# Patient Record
Sex: FEMALE | Race: WHITE | Employment: PART TIME | URBAN - NONMETROPOLITAN AREA
[De-identification: names, ages, dates, MRNs, and addresses within clinical notes are randomized per-mention and may not be internally consistent; named-entity substitution may affect disease eponyms.]

---

## 2017-08-21 PROBLEM — M54.16 LUMBAR RADICULOPATHY: Status: ACTIVE | Noted: 2017-08-21

## 2019-05-27 ENCOUNTER — HOSPITAL ENCOUNTER (EMERGENCY)
Age: 34
Discharge: HOME | End: 2019-05-27
Attending: EMERGENCY MEDICINE

## 2019-05-27 ENCOUNTER — APPOINTMENT (OUTPATIENT)
Dept: GENERAL RADIOLOGY | Age: 34
End: 2019-05-27

## 2019-05-27 VITALS
TEMPERATURE: 98 F | SYSTOLIC BLOOD PRESSURE: 159 MMHG | WEIGHT: 210 LBS | HEART RATE: 107 BPM | RESPIRATION RATE: 20 BRPM | BODY MASS INDEX: 34.99 KG/M2 | HEIGHT: 65 IN | DIASTOLIC BLOOD PRESSURE: 85 MMHG | OXYGEN SATURATION: 100 %

## 2019-05-27 DIAGNOSIS — S42.92XA CLOSED FRACTURE DISLOCATION OF LEFT SHOULDER, INITIAL ENCOUNTER: Primary | ICD-10-CM

## 2019-05-27 PROCEDURE — 96374 THER/PROPH/DIAG INJ IV PUSH: CPT

## 2019-05-27 PROCEDURE — 73020 X-RAY EXAM OF SHOULDER: CPT

## 2019-05-27 PROCEDURE — 99152 MOD SED SAME PHYS/QHP 5/>YRS: CPT

## 2019-05-27 PROCEDURE — 4500000023 HC ED LEVEL 3 PROCEDURE

## 2019-05-27 PROCEDURE — 2500000003 HC RX 250 WO HCPCS

## 2019-05-27 PROCEDURE — 6360000002 HC RX W HCPCS: Performed by: EMERGENCY MEDICINE

## 2019-05-27 PROCEDURE — 99283 EMERGENCY DEPT VISIT LOW MDM: CPT

## 2019-05-27 PROCEDURE — 6360000002 HC RX W HCPCS

## 2019-05-27 PROCEDURE — 73030 X-RAY EXAM OF SHOULDER: CPT

## 2019-05-27 PROCEDURE — 96375 TX/PRO/DX INJ NEW DRUG ADDON: CPT

## 2019-05-27 RX ORDER — ONDANSETRON 2 MG/ML
INJECTION INTRAMUSCULAR; INTRAVENOUS
Status: COMPLETED
Start: 2019-05-27 | End: 2019-05-27

## 2019-05-27 RX ORDER — FENTANYL CITRATE 50 UG/ML
50 INJECTION, SOLUTION INTRAMUSCULAR; INTRAVENOUS ONCE
Status: COMPLETED | OUTPATIENT
Start: 2019-05-27 | End: 2019-05-27

## 2019-05-27 RX ORDER — ONDANSETRON 2 MG/ML
4 INJECTION INTRAMUSCULAR; INTRAVENOUS ONCE
Status: COMPLETED | OUTPATIENT
Start: 2019-05-27 | End: 2019-05-27

## 2019-05-27 RX ORDER — KETAMINE HYDROCHLORIDE 50 MG/ML
2 INJECTION, SOLUTION, CONCENTRATE INTRAMUSCULAR; INTRAVENOUS ONCE
Status: COMPLETED | OUTPATIENT
Start: 2019-05-27 | End: 2019-05-27

## 2019-05-27 RX ORDER — KETAMINE HYDROCHLORIDE 50 MG/ML
INJECTION, SOLUTION, CONCENTRATE INTRAMUSCULAR; INTRAVENOUS
Status: COMPLETED
Start: 2019-05-27 | End: 2019-05-27

## 2019-05-27 RX ORDER — PROMETHAZINE HYDROCHLORIDE 25 MG/ML
INJECTION, SOLUTION INTRAMUSCULAR; INTRAVENOUS
Status: COMPLETED
Start: 2019-05-27 | End: 2019-05-27

## 2019-05-27 RX ORDER — PROMETHAZINE HYDROCHLORIDE 25 MG/ML
12.5 INJECTION, SOLUTION INTRAMUSCULAR; INTRAVENOUS ONCE
Status: COMPLETED | OUTPATIENT
Start: 2019-05-27 | End: 2019-05-27

## 2019-05-27 RX ORDER — OXYCODONE HYDROCHLORIDE AND ACETAMINOPHEN 5; 325 MG/1; MG/1
1 TABLET ORAL EVERY 6 HOURS PRN
Qty: 20 TABLET | Refills: 0 | Status: SHIPPED | OUTPATIENT
Start: 2019-05-27 | End: 2019-05-30

## 2019-05-27 RX ORDER — FENTANYL CITRATE 50 UG/ML
INJECTION, SOLUTION INTRAMUSCULAR; INTRAVENOUS
Status: COMPLETED
Start: 2019-05-27 | End: 2019-05-27

## 2019-05-27 RX ADMIN — FENTANYL CITRATE 50 MCG: 50 INJECTION, SOLUTION INTRAMUSCULAR; INTRAVENOUS at 19:24

## 2019-05-27 RX ADMIN — ONDANSETRON 4 MG: 2 INJECTION INTRAMUSCULAR; INTRAVENOUS at 19:24

## 2019-05-27 RX ADMIN — PROMETHAZINE HYDROCHLORIDE 12.5 MG: 25 INJECTION, SOLUTION INTRAMUSCULAR; INTRAVENOUS at 21:35

## 2019-05-27 RX ADMIN — KETAMINE HYDROCHLORIDE 280 MG: 50 INJECTION INTRAMUSCULAR; INTRAVENOUS at 21:27

## 2019-05-27 RX ADMIN — PROMETHAZINE HYDROCHLORIDE 12.5 MG: 25 INJECTION INTRAMUSCULAR; INTRAVENOUS at 21:35

## 2019-05-27 RX ADMIN — KETAMINE HYDROCHLORIDE 280 MG: 50 INJECTION, SOLUTION, CONCENTRATE INTRAMUSCULAR; INTRAVENOUS at 21:27

## 2019-05-27 RX ADMIN — FENTANYL CITRATE 50 MCG: 50 INJECTION, SOLUTION INTRAMUSCULAR; INTRAVENOUS at 19:52

## 2019-05-27 ASSESSMENT — PAIN DESCRIPTION - PAIN TYPE: TYPE: ACUTE PAIN

## 2019-05-27 ASSESSMENT — PAIN DESCRIPTION - LOCATION: LOCATION: SHOULDER

## 2019-05-27 ASSESSMENT — PAIN SCALES - GENERAL: PAINLEVEL_OUTOF10: 10

## 2019-05-27 ASSESSMENT — PAIN DESCRIPTION - ORIENTATION: ORIENTATION: LEFT

## 2019-05-27 NOTE — ED NOTES
Ortho paged through [facility] @ [address]  05/27/19 1944

## 2019-05-27 NOTE — ED PROVIDER NOTES
Triage Chief Complaint:   Shoulder Injury (pt states she fell through a loose board in the floor injuring her L shoulder)      [place]:  Junior Tejeda is a 35 y.o. female that presents  With a left shoulder injury. Patient fell and grabbed a hand rail which resulted in pain in her left shoulder. She is concerned that her shoulder may be dislocated. She does not have a history of dislocated shoulder and is otherwise healthy. She does not believe herself to be pregnant. There was no open wound associated with this injury which occurred shortly before arrival in the emergency department    ROS:  Review of systems was reviewed for 10 systems and is otherwise negative except as in the [address]    Past Medical History:   Diagnosis Date    Polycystic ovarian syndrome      Past Surgical History:   Procedure Laterality Date    CYST REMOVAL       History reviewed. No pertinent family history.   Social History     Socioeconomic History    Marital status: Single     Spouse name: Not on file    Number of children: Not on file    Years of education: Not on file    Highest education level: Not on file   Occupational History    Not on file   Social Needs    Financial resource strain: Not on file    Food insecurity:     Worry: Not on file     Inability: Not on file    Transportation needs:     Medical: Not on file     Non-medical: Not on file   Tobacco Use    Smoking status: Never Smoker    Smokeless tobacco: Never Used   Substance and Sexual Activity    Alcohol use: Yes     Comment: weekly    Drug use: No    Sexual activity: Not on file   Lifestyle    Physical activity:     Days per week: Not on file     Minutes per session: Not on file    Stress: Not on file   Relationships    Social connections:     Talks on phone: Not on file     Gets together: Not on file     Attends Buddhist service: Not on file     Active member of club or organization: Not on file     Attends meetings of clubs or organizations: Not on file understands that she may need surgery. Pain medicine will be provided and she is to remain in the sling and use ice until seeing Dr. Devin Ayala    Final Impression:  1. Closed fracture dislocation of left shoulder, initial encounter        Critical Care:       Disposition referral (if applicable):  Luca Call MD  [id number] [facility] [phone]    Call in 1 day        Disposition medications (if applicable):  Discharge Medication List as of 5/27/2019  9:08 PM      START taking these medications    Details   oxyCODONE-acetaminophen (PERCOCET) 5-325 MG per tablet Take 1 tablet by mouth every 6 hours as needed for Pain for up to 3 days. Intended supply: 3 days. Take lowest dose possible to manage pain, Disp-20 tablet, R-0Print             Comment: Please note this report has been produced using speech recognition software and may contain errors related to that system including errors in grammar, punctuation, and spelling, as well as words and phrases that may be inappropriate. If there are any questions or concerns please feel free to contact the dictating provider for clarification.       (Please note that portions of this note may have been completed with a voice recognition program. Efforts were made to edit the dictations but occasionally words are mis-transcribed.)    MD Roni Mcintosh., MD  05/28/19 6368

## 2019-05-30 ENCOUNTER — OFFICE VISIT (OUTPATIENT)
Dept: ORTHOPEDIC SURGERY | Age: 34
End: 2019-05-30
Payer: COMMERCIAL

## 2019-05-30 VITALS — WEIGHT: 210.1 LBS | BODY MASS INDEX: 35 KG/M2 | HEIGHT: 65 IN

## 2019-05-30 DIAGNOSIS — S42.252A CLOSED DISPLACED FRACTURE OF GREATER TUBEROSITY OF LEFT HUMERUS, INITIAL ENCOUNTER: ICD-10-CM

## 2019-05-30 DIAGNOSIS — S43.005A DISLOCATION OF LEFT SHOULDER JOINT, INITIAL ENCOUNTER: ICD-10-CM

## 2019-05-30 DIAGNOSIS — M25.512 LEFT SHOULDER PAIN, UNSPECIFIED CHRONICITY: Primary | ICD-10-CM

## 2019-05-30 PROCEDURE — 99243 OFF/OP CNSLTJ NEW/EST LOW 30: CPT | Performed by: ORTHOPAEDIC SURGERY

## 2019-05-30 NOTE — PROGRESS NOTES
status: Never Smoker    Smokeless tobacco: Never Used   Substance and Sexual Activity    Alcohol use: Yes     Comment: weekly    Drug use: No    Sexual activity: Not on file   Lifestyle    Physical activity:     Days per week: Not on file     Minutes per session: Not on file    Stress: Not on file   Relationships    Social connections:     Talks on phone: Not on file     Gets together: Not on file     Attends Buddhist service: Not on file     Active member of club or organization: Not on file     Attends meetings of clubs or organizations: Not on file     Relationship status: Not on file    Intimate partner violence:     Fear of current or ex partner: Not on file     Emotionally abused: Not on file     Physically abused: Not on file     Forced sexual activity: Not on file   Other Topics Concern    Not on file   Social History Narrative    Not on file       No family history on file. Review of Systems  Pertinent items are noted in HPI  Review of systems reviewed from Patient History Form dated on 5/30/19 and available in the patient's chart under the Media tab. Vital Signs  There were no vitals filed for this visit. General Exam:   Constitutional: Patient is adequately groomed with no evidence of malnutrition  Mental Status: The patient is oriented to time, place and person. The patient's mood and affect are appropriate. Lymphatic: The lymphatic examination bilaterally reveals all areas to be without enlargement or induration. Neurological: The patient has good coordination. There is no weakness or sensory deficit. Gait: normal    Left shoulder and Elbow Examination  Inspection:  Positive bruising medial aspect of arm, no obvious deformity    Palpation:  Tenderness globally around the shoulder, no elbow tenderness    Range of Motion:  Range of motion not assessed, normal hand wrist and elbow motion    Sensation: In tact to light touch all nerve distributions     Strength:   Motor appears intact with deltoid firing, otherwise motor intact C5 to T1    Special Tests:  None    Skin: There are no additional worrisome rashes, ulcerations or lesions. Circulation normal    Additional Examinations:  Right Upper Extremity:  Examination of the right upper extremity does not show any tenderness, deformity or injury. Range of motion is unremarkable. There is no gross instability. There are no rashes, ulcerations or lesions. Strength and tone are normal.      Radiology:     X-rays obtained and reviewed in office:  AP and the plane of the joint and scapular wide 2 views of the left shoulder obtained in office today 5/30/19 demonstrate apparent reduction of the dislocation with evidence of fracture of the greater tuberosity extending distally to the proximal aspect of the shaft with good alignment. There does appear to be some comminution at the greater tuberosity with possible displacement of some of the fragments posteriorly. Unable to perform axillary view today secondary to patient unable to comply    EXAMINATION:   1 XRAY VIEWS OF THE LEFT SHOULDER       5/27/2019 5:50 pm       COMPARISON:   None.       HISTORY:   ORDERING SYSTEM PROVIDED HISTORY: post procedure   TECHNOLOGIST PROVIDED HISTORY:   Reason for exam:->post procedure   Ordering Physician Provided Reason for Exam: Shoulder Injury (pt states she   fell through a loose board in the floor injuring her L shoulder)   post   reduction   Acuity: Acute   Type of Exam: Subsequent/Follow-up       FINDINGS:   The fracture dislocation of the left shoulder were appears reduced, but only   a single projection was performed, therefore the AP location of the humeral   head cannot be ascertained with complete certainty.  Fracture of the greater   tuberosity is again noted, and the distraction of the fracture fragments is   decreased.           Impression   The fracture dislocation of the left shoulder appears to be reduced on this   single projection.  Again, AP location of the humeral head cannot be   ascertained with complete certainty as only 1 projection was performed. EXAMINATION:   2 XRAY VIEWS OF THE LEFT SHOULDER       5/27/2019 7:39 pm       COMPARISON:   None.       HISTORY:   ORDERING SYSTEM PROVIDED HISTORY: Trauma   TECHNOLOGIST PROVIDED HISTORY:   Reason for exam:->Trauma   Ordering Physician Provided Reason for Exam: Shoulder Injury (pt states she   fell through a loose board in the floor injuring her L shoulder)   Acuity: Acute   Type of Exam: Initial       FINDINGS:   Anterior inferior dislocation of the humerus.  Comminuted fracture in the   region of the greater tuberosity of the proximal humerus.  The AC joint is in   anatomic alignment.           Impression   1. Anterior inferior dislocation of the humerus   2. Comminuted fracture in the region of the greater tuberosity         Assessment:  72-year-old female with a left shoulder dislocation with a greater tuberosity fracture    Office Procedures:  Orders Placed This Encounter   Procedures    XR SHOULDER LEFT (MIN 2 VIEWS)     Standing Status:   Future     Number of Occurrences:   1     Standing Expiration Date:   5/30/2020       Plan:   -We discussed that ideally a CT scan would be performed to fully evaluate her greater tuberosity for placement as well as comminution, however she wishes to avoid this if possible secondary to financial constraints  -I do think based on the alignment of her fracture currently we can likely treat her with nonsurgical treatment, however I would recommend close clinical follow-up. We will see her back in 1 week for x-rays 4 views of her left shoulder. We discussed we will need to get an axillary view to fully evaluate her greater tuberosity placement  -We discussed there is significant displacement that could be possible malunion which could lead to functional deficits  -At this time she will remain in the sling.   She may remove it for hand wrist elbow motion only and hygiene  -She may transition over-the-counter medication  -We will provide her with documentation regarding her limitations at this time for work and school  -We will see her back in 1 week and if there are issues interim she'll contact the office    Ju Barton. MD [place]  [phone] partner of [facility] ([facility])        Voice Recognition Dictation disclaimer: Please note that portions of this chart were generated using Dragon dictation software. Although every effort was made to ensure the accuracy of this automated transcription, some errors in transcription may have occurred.

## 2019-05-30 NOTE — LETTER
[place] [address]  Phone: [phone]  Fax: [phone]    Tess Hopson MD        May 30, 2019     Patient: Shireen Marcano   YOB: 1985   Date of Visit: 5/30/2019       To Whom it May Concern:    Trinidad Lovell was seen in my clinic on 5/30/2019. Due to her left shoulder injury she in unable to use her left upper extremity at this time until further release. If you have any questions or concerns, please don't hesitate to call.     Sincerely,         Tess Hopson MD

## 2019-06-06 ENCOUNTER — OFFICE VISIT (OUTPATIENT)
Dept: ORTHOPEDIC SURGERY | Age: 34
End: 2019-06-06
Payer: COMMERCIAL

## 2019-06-06 VITALS — WEIGHT: 210.1 LBS | HEIGHT: 65 IN | BODY MASS INDEX: 35 KG/M2

## 2019-06-06 DIAGNOSIS — S42.252A CLOSED DISPLACED FRACTURE OF GREATER TUBEROSITY OF LEFT HUMERUS, INITIAL ENCOUNTER: ICD-10-CM

## 2019-06-06 DIAGNOSIS — M25.512 PAIN IN JOINT OF LEFT SHOULDER REGION: Primary | ICD-10-CM

## 2019-06-06 DIAGNOSIS — S43.005A DISLOCATION OF LEFT SHOULDER JOINT, INITIAL ENCOUNTER: ICD-10-CM

## 2019-06-06 PROCEDURE — 99213 OFFICE O/P EST LOW 20 MIN: CPT | Performed by: ORTHOPAEDIC SURGERY

## 2019-06-06 NOTE — PROGRESS NOTES
Concern    Not on file   Social History Narrative    Not on file       No family history on file. Review of Systems  Pertinent items are noted in HPI  Review of systems reviewed from Patient History Form dated on 5/30/19 and available in the patient's chart under the Media tab. Vital Signs  There were no vitals filed for this visit. General Exam:   Constitutional: Patient is adequately groomed with no evidence of malnutrition  Mental Status: The patient is oriented to time, place and person. The patient's mood and affect are appropriate. Lymphatic: The lymphatic examination bilaterally reveals all areas to be without enlargement or induration. Neurological: The patient has good coordination. There is no weakness or sensory deficit. Gait: Normal    Left Upper extremity  Examination  Inspection:  Positive bruising anterior aspect of arm, no obvious deformity    Palpation:  Tenderness globally around the shoulder    Range of Motion:  Shoulder range of motion not assessed today    Sensation: In tact to light touch all nerve distributions     Strength: Motor intact without deficit, shoulder strength not assessed    Special Tests:  None    Skin: There are no additional worrisome rashes, ulcerations or lesions. Circulation normal    Additional Examinations:  Right Upper Extremity:  Examination of the right upper extremity does not show any tenderness, deformity or injury. Range of motion is unremarkable. There is no gross instability. There are no rashes, ulcerations or lesions. Strength and tone are normal.      Radiology:     X-rays obtained and reviewed in office:  AP, AP in the plane of the joint, scapular Y, axillary 4 views left shoulder obtained 6/6/19 compared to previous x-rays demonstrate no dislocation. Evidence of greater tuberosity fracture with good alignment of the majority the fracture piece with some small evidence of comminution and displaced small fragments.   Good alignment of the fracture with no further displacement      Assessment:  44-year-old female 1.5 weeks after a left shoulder dislocation with a greater tuberosity fracture    Office Procedures:  Orders Placed This Encounter   Procedures    XR SHOULDER LEFT (MIN 2 VIEWS)     Standing Status:   Future     Number of Occurrences:   1     Standing Expiration Date:   7/6/2019       Plan:   -This time the alignment of her fracture is sufficient for continued nonoperative treatment. We discussed we will allow her greater tuberosity fracture to heal and then begin with therapy  -He is to remain nonweightbearing and in her sling for her left upper extremity at this time. She may remove it for hygiene as well as motion of her hand wrist and elbow  -She may transition to over-the-counter medication  -We will see her back in 5 weeks for repeat evaluation and x-rays of the left shoulder and if there are issues interim she'll contact the office    Rashad Hedrick MD  [address] [facility] partner of [facility]        Voice Recognition Dictation disclaimer: Please note that portions of this chart were generated using Dragon dictation software. Although every effort was made to ensure the accuracy of this automated transcription, some errors in transcription may have occurred.

## 2019-06-06 NOTE — LETTER
[facility] [place] [address]  Phone: [phone]  Fax: [phone]    Rich Sherman MD        June 6, 2019     Patient: Horace Ruiz   YOB: 1985   Date of Visit: 6/6/2019       To Whom it May Concern:    Adrian Muñoz was seen in my clinic on 6/6/2019. Due to her injury, no use of her left upper extremity until further release. If you have any questions or concerns, please don't hesitate to call.     Sincerely,         Rich Sherman MD

## 2019-07-15 ENCOUNTER — OFFICE VISIT (OUTPATIENT)
Dept: ORTHOPEDIC SURGERY | Age: 34
End: 2019-07-15
Payer: COMMERCIAL

## 2019-07-15 VITALS — HEIGHT: 65 IN | BODY MASS INDEX: 35 KG/M2 | WEIGHT: 210.1 LBS

## 2019-07-15 DIAGNOSIS — S43.005A DISLOCATION OF LEFT SHOULDER JOINT, INITIAL ENCOUNTER: Primary | ICD-10-CM

## 2019-07-15 PROCEDURE — 99213 OFFICE O/P EST LOW 20 MIN: CPT | Performed by: ORTHOPAEDIC SURGERY

## 2019-07-24 ENCOUNTER — TELEPHONE (OUTPATIENT)
Dept: ORTHOPEDIC SURGERY | Age: 34
End: 2019-07-24

## 2019-07-30 ENCOUNTER — HOSPITAL ENCOUNTER (OUTPATIENT)
Dept: MRI IMAGING | Age: 34
Discharge: HOME | End: 2019-07-30
Payer: COMMERCIAL

## 2019-07-30 DIAGNOSIS — S43.005A DISLOCATION OF LEFT SHOULDER JOINT, INITIAL ENCOUNTER: ICD-10-CM

## 2019-07-30 PROCEDURE — 73221 MRI JOINT UPR EXTREM W/O DYE: CPT

## 2019-08-05 ENCOUNTER — HOSPITAL ENCOUNTER (OUTPATIENT)
Dept: PHYSICAL THERAPY | Age: 34
Setting detail: THERAPIES SERIES
Discharge: HOME | End: 2019-08-05
Payer: COMMERCIAL

## 2019-08-05 ENCOUNTER — OFFICE VISIT (OUTPATIENT)
Dept: ORTHOPEDIC SURGERY | Age: 34
End: 2019-08-05
Payer: COMMERCIAL

## 2019-08-05 VITALS — WEIGHT: 210.1 LBS | HEIGHT: 65 IN | BODY MASS INDEX: 35 KG/M2

## 2019-08-05 DIAGNOSIS — S42.255D CLOSED NONDISPLACED FRACTURE OF GREATER TUBEROSITY OF LEFT HUMERUS WITH ROUTINE HEALING, SUBSEQUENT ENCOUNTER: Primary | ICD-10-CM

## 2019-08-05 PROCEDURE — 99213 OFFICE O/P EST LOW 20 MIN: CPT | Performed by: ORTHOPAEDIC SURGERY

## 2019-08-05 PROCEDURE — 97161 PT EVAL LOW COMPLEX 20 MIN: CPT

## 2019-08-05 PROCEDURE — 97035 APP MDLTY 1+ULTRASOUND EA 15: CPT

## 2019-08-05 PROCEDURE — 97140 MANUAL THERAPY 1/> REGIONS: CPT

## 2019-08-05 NOTE — PROGRESS NOTES
Chief Complaint  Follow-up (left Shoulder: MRI review )      History of Present Illness:  Roselyn Cortez is a 35 y.o. y/o female who presents today for follow up of her left shoulder. She is here today to review her MRI. She says her pain is been getting better and she is may be just a little bit of improvement in her function, but still significant weakness. She states previously she did have some numbness and tingling her hand which is no longer there. No new acute trauma.       Medical History  Past Medical History:   Diagnosis Date    Polycystic ovarian syndrome        Past Surgical History:   Procedure Laterality Date    CYST REMOVAL         Social History     Socioeconomic History    Marital status: Single     Spouse name: Not on file    Number of children: Not on file    Years of education: Not on file    Highest education level: Not on file   Occupational History    Not on file   Social Needs    Financial resource strain: Not on file    Food insecurity:     Worry: Not on file     Inability: Not on file    Transportation needs:     Medical: Not on file     Non-medical: Not on file   Tobacco Use    Smoking status: Never Smoker    Smokeless tobacco: Never Used   Substance and Sexual Activity    Alcohol use: Yes     Comment: weekly    Drug use: No    Sexual activity: Not on file   Lifestyle    Physical activity:     Days per week: Not on file     Minutes per session: Not on file    Stress: Not on file   Relationships    Social connections:     Talks on phone: Not on file     Gets together: Not on file     Attends Yazdanism service: Not on file     Active member of club or organization: Not on file     Attends meetings of clubs or organizations: Not on file     Relationship status: Not on file    Intimate partner violence:     Fear of current or ex partner: Not on file     Emotionally abused: Not on file     Physically abused: Not on file     Forced sexual activity: Not on file   Other Topics

## 2019-08-05 NOTE — PLAN OF CARE
Psychological Disorders  []Anxiety (F41.9)  []Depression (F32.9)   []Other:   []Other:          Barriers to/and or personal factors that will affect rehab potential:              []Age  []Sex              []Motivation/Lack of Motivation                        []Co-Morbidities              []Cognitive Function, education/learning barriers              []Environmental, home barriers              []profession/work barriers  []past PT/medical experience  [x]other: None  Justification:      Falls Risk Assessment (30 days):   [x] Falls Risk assessed and no intervention required. [] Falls Risk assessed and Patient requires intervention due to being higher risk   TUG score (>12s at risk):     [] Falls education provided, including     ASSESSMENT: Moises Monique is a 35 y.o. female reporting to OP PT with c/c of left shoulder pain and stiffness which has been occurring since fall on Memorial day. . Pt is noted to have significant reduction in AROM and PROM. Strength testing was deferred.        Functional Impairments   [x]Noted UE joint hypomobility   []Noted UE joint hypermobility   [x]Decreased UE functional ROM   [x]Decreased UE functional strength   []Abnormal reflexes/sensation/myotomal/dermatomal deficits   []Decreased RC/scapular/core strength and neuromuscular control   []other:      Functional Activity Limitations (from functional questionnaire and intake)   []Reduced ability to tolerate prolonged functional positions   []Reduced ability or difficulty with changes of positions or transfers between positions   []Reduced ability to maintain good posture and demonstrate good body mechanics with sitting, bending, and lifting   [] Reduced ability or tolerance with driving and/or computer work   [x]Reduced ability to sleep   [x]Reduced ability to perform lifting, reaching, carrying tasks   [x]Reduced ability to tolerate impact through UE   [x]Reduced ability to reach behind back   [x]Reduced ability to  or hold

## 2019-08-08 ENCOUNTER — HOSPITAL ENCOUNTER (OUTPATIENT)
Dept: PHYSICAL THERAPY | Age: 34
Setting detail: THERAPIES SERIES
Discharge: HOME | End: 2019-08-08
Payer: COMMERCIAL

## 2019-08-08 PROCEDURE — 97110 THERAPEUTIC EXERCISES: CPT

## 2019-08-08 PROCEDURE — 97140 MANUAL THERAPY 1/> REGIONS: CPT

## 2019-08-08 NOTE — FLOWSHEET NOTE
related to strengthening, flexibility, endurance, ROM  for improvements in scapular, scapulothoracic and UE control with self care, reaching, carrying, lifting, house/yardwork, driving/computer work. [x] (29116) Provided verbal/tactile cueing for activities related to improving balance, coordination, kinesthetic sense, posture, motor skill, proprioception  to assist with  scapular, scapulothoracic and UE control with self care, reaching, carrying, lifting, house/yardwork, driving/computer work. Therapeutic Activities:    [x] (56537 or 63254) Provided verbal/tactile cueing for activities related to improving balance, coordination, kinesthetic sense, posture, motor skill, proprioception and motor activation to allow for proper function of scapular, scapulothoracic and UE control with self care, carrying, lifting, driving/computer work.      Home Exercise Program:    [x] (26790) Reviewed/Progressed HEP activities related to strengthening, flexibility, endurance, ROM of scapular, scapulothoracic and UE control with self care, reaching, carrying, lifting, house/yardwork, driving/computer work  [x] (56299) Reviewed/Progressed HEP activities related to improving balance, coordination, kinesthetic sense, posture, motor skill, proprioception of scapular, scapulothoracic and UE control with self care, reaching, carrying, lifting, house/yardwork, driving/computer work      Manual Treatments:  PROM / STM / Oscillations-Mobs:  G-I, II, III, IV (PA's, Inf., Post.)  [x] (75012) Provided manual therapy to mobilize soft tissue/joints of cervical/CT, scapular GHJ and UE for the purpose of modulating pain, promoting relaxation,  increasing ROM, reducing/eliminating soft tissue swelling/inflammation/restriction, improving soft tissue extensibility and allowing for proper ROM for normal function with self care, reaching, carrying, lifting, house/yardwork, driving/computer work    Modalities:  None    Charges: 40/40; 2 TE 1 Man GOALS:  Patient stated goal: Return to work/school, improving ROM     Therapist goals for Patient:   Short Term Goals: To be achieved in: 2 weeks  1. Independent in HEP and progression per patient tolerance, in order to prevent re-injury. 2. Patient will have a decrease in pain to facilitate improvement in movement, function, and ADLs as indicated by Functional Deficits.     Long Term Goals: To be achieved in: 6 weeks  1. Disability index score of 23% or less for the DASH to assist with reaching prior level of function. 2. Patient will demonstrate increased AROM Flex/abd/ER/IR to 145/145/40/sacrum to allow for proper joint functioning as indicated by patients Functional Deficits. 3. Patient will demonstrate an increase in Strength to >=+4+/5 to allow for proper functional mobility as indicated by patients Functional Deficits. 4. Patient will return to reaching functional activities without increased symptoms or restriction. 5. Pt will be able to return to work and or school (patient specific functional goal)         Progression Towards Functional goals:  [x] Patient is progressing as expected towards functional goals listed. [] Progression is slowed due to complexities listed. [] Progression has been slowed due to co-morbidities. [x] Plan just implemented, too soon to assess goals progression  [] Other:     ASSESSMENT:   Kaity sykes session well. She remains quite stiff passively. Able to make slight progressions.      End session pain /10    Treatment/Activity Tolerance:  [x] Patient tolerated treatment well [] Patient limited by fatique  [] Patient limited by pain  [] Patient limited by other medical complications  [] Other:     Prognosis: [x] Good [] Fair  [] Poor    Patient Requires Follow-up: [x] Yes  [] No    PLAN: See clotilde  [x] Continue per plan of care [] Alter current plan (see comments)  [] Plan of care initiated [] Hold pending MD visit [] Discharge    Electronically signed by: Shama Babin

## 2019-08-12 ENCOUNTER — HOSPITAL ENCOUNTER (OUTPATIENT)
Dept: PHYSICAL THERAPY | Age: 34
Setting detail: THERAPIES SERIES
Discharge: HOME | End: 2019-08-12
Payer: COMMERCIAL

## 2019-08-12 PROCEDURE — 97110 THERAPEUTIC EXERCISES: CPT

## 2019-08-12 PROCEDURE — 97140 MANUAL THERAPY 1/> REGIONS: CPT

## 2019-08-12 NOTE — FLOWSHEET NOTE
verbal/tactile cueing for activities related to strengthening, flexibility, endurance, ROM  for improvements in scapular, scapulothoracic and UE control with self care, reaching, carrying, lifting, house/yardwork, driving/computer work. [x] (30233) Provided verbal/tactile cueing for activities related to improving balance, coordination, kinesthetic sense, posture, motor skill, proprioception  to assist with  scapular, scapulothoracic and UE control with self care, reaching, carrying, lifting, house/yardwork, driving/computer work. Therapeutic Activities:    [x] (14887 or 47211) Provided verbal/tactile cueing for activities related to improving balance, coordination, kinesthetic sense, posture, motor skill, proprioception and motor activation to allow for proper function of scapular, scapulothoracic and UE control with self care, carrying, lifting, driving/computer work.      Home Exercise Program:    [x] (59510) Reviewed/Progressed HEP activities related to strengthening, flexibility, endurance, ROM of scapular, scapulothoracic and UE control with self care, reaching, carrying, lifting, house/yardwork, driving/computer work  [x] (50350) Reviewed/Progressed HEP activities related to improving balance, coordination, kinesthetic sense, posture, motor skill, proprioception of scapular, scapulothoracic and UE control with self care, reaching, carrying, lifting, house/yardwork, driving/computer work      Manual Treatments:  PROM / STM / Oscillations-Mobs:  G-I, II, III, IV (PA's, Inf., Post.)  [x] (26477) Provided manual therapy to mobilize soft tissue/joints of cervical/CT, scapular GHJ and UE for the purpose of modulating pain, promoting relaxation,  increasing ROM, reducing/eliminating soft tissue swelling/inflammation/restriction, improving soft tissue extensibility and allowing for proper ROM for normal function with self care, reaching, carrying, lifting, house/yardwork, driving/computer work    Modalities:

## 2019-08-15 ENCOUNTER — HOSPITAL ENCOUNTER (OUTPATIENT)
Dept: PHYSICAL THERAPY | Age: 34
Setting detail: THERAPIES SERIES
Discharge: HOME | End: 2019-08-15
Payer: COMMERCIAL

## 2019-08-15 PROCEDURE — 97110 THERAPEUTIC EXERCISES: CPT

## 2019-08-15 PROCEDURE — 97112 NEUROMUSCULAR REEDUCATION: CPT

## 2019-08-15 PROCEDURE — 97140 MANUAL THERAPY 1/> REGIONS: CPT

## 2019-08-19 ENCOUNTER — HOSPITAL ENCOUNTER (OUTPATIENT)
Dept: PHYSICAL THERAPY | Age: 34
Setting detail: THERAPIES SERIES
Discharge: HOME | End: 2019-08-19
Payer: COMMERCIAL

## 2019-08-19 PROCEDURE — 97140 MANUAL THERAPY 1/> REGIONS: CPT

## 2019-08-19 PROCEDURE — 97110 THERAPEUTIC EXERCISES: CPT

## 2019-08-19 PROCEDURE — 97112 NEUROMUSCULAR REEDUCATION: CPT

## 2019-08-19 NOTE — FLOWSHEET NOTE
driving/computer work    Modalities:  None    Charges: 43/43; 1 Man 1 NR 1 TE      GOALS:  Patient stated goal: Return to work/school, improving ROM     Therapist goals for Patient:   Short Term Goals: To be achieved in: 2 weeks  1. Independent in HEP and progression per patient tolerance, in order to prevent re-injury. 2. Patient will have a decrease in pain to facilitate improvement in movement, function, and ADLs as indicated by Functional Deficits.     Long Term Goals: To be achieved in: 6 weeks  1. Disability index score of 23% or less for the DASH to assist with reaching prior level of function. 2. Patient will demonstrate increased AROM Flex/abd/ER/IR to 145/145/40/sacrum to allow for proper joint functioning as indicated by patients Functional Deficits. 3. Patient will demonstrate an increase in Strength to >=+4+/5 to allow for proper functional mobility as indicated by patients Functional Deficits. 4. Patient will return to reaching functional activities without increased symptoms or restriction. 5. Pt will be able to return to work and or school (patient specific functional goal)         Progression Towards Functional goals:  [x] Patient is progressing as expected towards functional goals listed. [] Progression is slowed due to complexities listed. [] Progression has been slowed due to co-morbidities. [] Plan just implemented, too soon to assess goals progression  [] Other:      ASSESSMENT:  Roxy Cross did well with session. AROM is improving as well as PROM. Shoulder hike remains.      End session pain 2/10    Treatment/Activity Tolerance:  [x] Patient tolerated treatment well [] Patient limited by fatique  [] Patient limited by pain  [] Patient limited by other medical complications  [] Other:     Prognosis: [x] Good [] Fair  [] Poor    Patient Requires Follow-up: [x] Yes  [] No    PLAN: See eval  [x] Continue per plan of care [] Alter current plan (see comments)  [] Plan of care initiated []

## 2019-08-22 ENCOUNTER — HOSPITAL ENCOUNTER (OUTPATIENT)
Dept: PHYSICAL THERAPY | Age: 34
Setting detail: THERAPIES SERIES
Discharge: HOME | End: 2019-08-22
Payer: COMMERCIAL

## 2019-08-22 PROCEDURE — 97110 THERAPEUTIC EXERCISES: CPT

## 2019-08-22 PROCEDURE — 97140 MANUAL THERAPY 1/> REGIONS: CPT

## 2019-08-22 PROCEDURE — 97112 NEUROMUSCULAR REEDUCATION: CPT

## 2019-08-22 NOTE — FLOWSHEET NOTE
[address] [place], [address]                Physical Therapy Daily Treatment Note  Date:  2019    Patient Name:  Viviane Sellers    :  1985  MRN: [id number]  Restrictions/Precautions:  No resistance  Medical/Treatment Diagnosis Information:  · Diagnosis: Closed nondisplaced fracture of greater tuberosity of left humerus  · Treatment Diagnosis: Left shoulder pain  Insurance/Certification information:  PT Insurance Information: Med mutual  Physician Information:  Referring Practitioner: Ambika Pabon MD  Plan of care signed (Y/N): Y      Date of Patient follow up with Physician:      Assessment Summary: [facility] is a 35 y.o. female reporting to OP PT with c/c of left shoulder pain and stiffness which has been occurring since fall on Memorial day. Pt is noted to have significant reduction in AROM and PROM. Strength testing was deferred. Progress Note: []  Yes  [x]  No      Latex Allergy:  [x]NO      []YES  Preferred Language for Healthcare:   [x]English       []other:    Visit # Insurance Allowable    40     Pain level:  2-310     SUBJECTIVE: Pt states shoulder is feeling less stiff.      OBJECTIVE:   PROM:   Flexion 155     AROM:  Flexion 115        RESTRICTIONS/PRECAUTIONS: No resistance    Exercises/Interventions:   Exercise Reps Notes HEP   Warm-up      Pulleys 5'           TABLE      Supine cane flexion --     SP x30     Concentric circles x30 ea     SL ER 10x2                       SEATED                                          STANDING      Rows 10x3 RTB     Ir ball behind back 20x2, 2#     Wall walks x15     Extension 10x2     Wall ball circles x20 ea, 2#     Saw ball 10x2     Counter top weight shifts 1'     Shoulder depressions x30 BTB                               Manual: PROM into shoulder flexion, abduction, ER and IR in neutral, 45° and 60° and 90° abduction    Therapeutic

## 2019-08-26 ENCOUNTER — HOSPITAL ENCOUNTER (OUTPATIENT)
Dept: PHYSICAL THERAPY | Age: 34
Setting detail: THERAPIES SERIES
Discharge: HOME | End: 2019-08-26
Payer: COMMERCIAL

## 2019-08-26 PROCEDURE — 97110 THERAPEUTIC EXERCISES: CPT

## 2019-08-26 PROCEDURE — 97112 NEUROMUSCULAR REEDUCATION: CPT

## 2019-08-26 PROCEDURE — 97140 MANUAL THERAPY 1/> REGIONS: CPT

## 2019-08-26 NOTE — FLOWSHEET NOTE
x30 BTB --                                        Manual: PROM into shoulder flexion, abduction, ER and IR in neutral, 45° and 60° and 90° abduction    Therapeutic Exercise and NMR EXR  [x] (63819) Provided verbal/tactile cueing for activities related to strengthening, flexibility, endurance, ROM  for improvements in scapular, scapulothoracic and UE control with self care, reaching, carrying, lifting, house/yardwork, driving/computer work. [x] (55659) Provided verbal/tactile cueing for activities related to improving balance, coordination, kinesthetic sense, posture, motor skill, proprioception  to assist with  scapular, scapulothoracic and UE control with self care, reaching, carrying, lifting, house/yardwork, driving/computer work. Therapeutic Activities:    [x] (99767 or 85902) Provided verbal/tactile cueing for activities related to improving balance, coordination, kinesthetic sense, posture, motor skill, proprioception and motor activation to allow for proper function of scapular, scapulothoracic and UE control with self care, carrying, lifting, driving/computer work.      Home Exercise Program:    [x] (28682) Reviewed/Progressed HEP activities related to strengthening, flexibility, endurance, ROM of scapular, scapulothoracic and UE control with self care, reaching, carrying, lifting, house/yardwork, driving/computer work  [x] (24076) Reviewed/Progressed HEP activities related to improving balance, coordination, kinesthetic sense, posture, motor skill, proprioception of scapular, scapulothoracic and UE control with self care, reaching, carrying, lifting, house/yardwork, driving/computer work      Manual Treatments:  PROM / STM / Oscillations-Mobs:  G-I, II, III, IV (PA's, Inf., Post.)  [x] (90998) Provided manual therapy to mobilize soft tissue/joints of cervical/CT, scapular GHJ and UE for the purpose of modulating pain, promoting relaxation,  increasing ROM, reducing/eliminating soft tissue

## 2019-08-29 ENCOUNTER — HOSPITAL ENCOUNTER (OUTPATIENT)
Dept: PHYSICAL THERAPY | Age: 34
Setting detail: THERAPIES SERIES
Discharge: HOME | End: 2019-08-29
Payer: COMMERCIAL

## 2019-08-29 PROCEDURE — 97140 MANUAL THERAPY 1/> REGIONS: CPT

## 2019-08-29 PROCEDURE — 97112 NEUROMUSCULAR REEDUCATION: CPT

## 2019-08-29 PROCEDURE — 97110 THERAPEUTIC EXERCISES: CPT

## 2019-08-29 NOTE — FLOWSHEET NOTE
Physical Therapist Assistant Activity Sheet  Date:  2019    Patient Name:  Gina Kennedy    :  1985  MRN: [id number]  Restrictions/Precautions:    Medical/Treatment Diagnosis Information:  ·   Diagnosis: Closed nondisplaced fracture of greater tuberosity of left humerus  ·  Treatment Diagnosis: Left shoulder pain  Physician Information:    Referring Practitioner: Tyler Wright MD      Weeks Post-op  2wks    4 wks 6 wks 8 wks   3wks 6 wks 9 wks 12 wks                        Activities                                                          DOS/DOI:                                                         Date: 19   ER Wall S  10\" x 10        Plyoback      Chop                          Chest                          ER Flip          Long/Short lever  Flex. Scap.                                  ABd.           punch          Body/Cardio Blade Flex/Ext                                     IR/ER                                     ABd/ADd          Push-up      Plus                            Wall  X 30 SB x 20                       Table                         Floor          No Money/HAB-HAD/Stance          Ball on Wall                 Tramp          Theraband    Rows/Ext Blue x 30 ea Black x 30 ea                         IR Blue x 30 Black x 30                         ER Blue x 30 Black x 30                         No money                           Horiz. ABd  Red x 20                         Biceps                           Triceps next Black x 30                         D2  Yellow x 20        Cable Column   Rows                                Ext. Lat. Pulldown                                Biceps                                Triceps          SL ABD, ER 2 x 10    IR Ball Around Back  4# x 20        Modality declined declined   PTA Assessment Introduced TB ex today with good understanding.  Provided new HEP to

## 2019-09-03 ENCOUNTER — HOSPITAL ENCOUNTER (OUTPATIENT)
Dept: PHYSICAL THERAPY | Age: 34
Setting detail: THERAPIES SERIES
Discharge: HOME | End: 2019-09-03
Payer: COMMERCIAL

## 2019-09-03 PROCEDURE — 97110 THERAPEUTIC EXERCISES: CPT

## 2019-09-03 PROCEDURE — 97140 MANUAL THERAPY 1/> REGIONS: CPT

## 2019-09-03 PROCEDURE — 97112 NEUROMUSCULAR REEDUCATION: CPT

## 2019-09-03 NOTE — FLOWSHEET NOTE
[address] [place], [address]                Physical Therapy Daily Treatment Note  Date:  9/3/2019    Patient Name:  Vinod Davis    :  1985  MRN: [id number]  Restrictions/Precautions:  No resistance  Medical/Treatment Diagnosis Information:  · Diagnosis: Closed nondisplaced fracture of greater tuberosity of left humerus  · Treatment Diagnosis: Left shoulder pain  Insurance/Certification information:  PT Insurance Information: Med mutual  Physician Information:  Referring Practitioner: Danya Rascon MD  Plan of care signed (Y/N): Y      Date of Patient follow up with Physician:      Assessment Summary: Marisol Puga is a 35 y.o. female reporting to OP PT with c/c of left shoulder pain and stiffness which has been occurring since fall on Memorial day. Pt is noted to have significant reduction in AROM and PROM. Strength testing was deferred. Progress Note: []  Yes  [x]  No       Latex Allergy:  [x]NO      []YES  Preferred Language for Healthcare:   [x]English       []other:    Visit # Insurance Allowable    40     Pain level:  3/10     SUBJECTIVE: Pt reports her shoulder pain is low but remains limited with functional movement.      OBJECTIVE:   PROM:   Flexion 160    AROM:  Flexion 135        RESTRICTIONS/PRECAUTIONS: No resistance    Exercises/Interventions:   Exercise 8/26/19 8/29/19 9/3/19  Notes HEP   Warm-up         Pulleys 5' 5' 5'               TABLE         Supine cane flexion -- -- Narrow and wide  1x10      SP X30, 1# X30, 2#       Concentric circles X30 X30, 2# ''      ABCs X1, 1# -- 3x10 2#      SL ER 10x3, 1#  3x10 2#      SL abduction 10x2        90/90 ER LLPS  3' Hand behind head      90/90 IR/ER  X30, 2#                                           SEATED                                    STANDING         Rows 10x3 GTB  2x15 GTB      IR ball behind back X30, 2#        ER ball behind

## 2019-09-05 ENCOUNTER — HOSPITAL ENCOUNTER (OUTPATIENT)
Dept: PHYSICAL THERAPY | Age: 34
Setting detail: THERAPIES SERIES
Discharge: HOME | End: 2019-09-05
Payer: COMMERCIAL

## 2019-09-05 PROCEDURE — 97110 THERAPEUTIC EXERCISES: CPT

## 2019-09-05 PROCEDURE — 97112 NEUROMUSCULAR REEDUCATION: CPT

## 2019-09-05 PROCEDURE — 97140 MANUAL THERAPY 1/> REGIONS: CPT

## 2019-09-09 ENCOUNTER — HOSPITAL ENCOUNTER (OUTPATIENT)
Dept: PHYSICAL THERAPY | Age: 34
Setting detail: THERAPIES SERIES
Discharge: HOME | End: 2019-09-09
Payer: COMMERCIAL

## 2019-09-09 PROCEDURE — 97112 NEUROMUSCULAR REEDUCATION: CPT

## 2019-09-09 PROCEDURE — 97140 MANUAL THERAPY 1/> REGIONS: CPT

## 2019-09-09 PROCEDURE — 97110 THERAPEUTIC EXERCISES: CPT

## 2019-09-09 NOTE — PROGRESS NOTES
restricted due to joint stiffness which is also limiting AROM. Pt has met some goals has made nice progress towards all of them. Would continue to benefit from PT progressing ROM and strength. OBJECTIVE:   PROM:   Flexion 160  90/90 ER 50% limtied    AROM:   Flexion 130  Abduction 130  ER 40  IR Sacrum    MMT:  Flexion 5/5  Abduction 5/5  ER 5/5  IR 5/5       GOALS:  Patient stated goal: Return to work/school, improving ROM     Therapist goals for Patient:   Short Term Goals: To be achieved in: 2 weeks  1. Independent in HEP and progression per patient tolerance, in order to prevent re-injury. MET  2. Patient will have a decrease in pain to facilitate improvement in movement, function, and ADLs as indicated by Functional Deficits. MET     Long Term Goals: To be achieved in: 6 weeks  1. Disability index score of 23% or less for the DASH to assist with reaching prior level of function. MET  2. Patient will demonstrate increased AROM Flex/abd/ER/IR to 145/145/40/sacrum to allow for proper joint functioning as indicated by patients Functional Deficits. NOT MET  3. Patient will demonstrate an increase in Strength to >=+4+/5 to allow for proper functional mobility as indicated by patients Functional Deficits. MET  4. Patient will return to reaching functional activities without increased symptoms or restriction. NOT MET  5.  Pt will be able to return to work and or school (patient specific functional goal)  NOT MET         PLAN: See eval  [x] Continue per plan of care [] Alter current plan (see comments)  [] Plan of care initiated [] Hold pending MD visit [] Discharge    Electronically signed by: Xavier Patel PT,DPT

## 2019-09-09 NOTE — FLOWSHEET NOTE
back  X30, 2# X30, 2#     ER ball behind head  X20, 2# X30, softball     Wall walks 5''x10 -- --     Extension 2x15 GTB 15x2 GTB 15x2 GTB     Wall ball circles  x20 ea, 2# x20 ea, 2#     Saw ball 2x10 10x3 10x3     Counter top weight shifts  1' 1'     Shoulder depressions  -- --     Wall push ups 2x10 10x2 --     Cane across back/IR   x20     Quick ball drops   20x2 ea softball               Manual: PROM into shoulder flexion, abduction, ER and IR in neutral, 45° and 60° and 90° abduction    Therapeutic Exercise and NMR EXR  [x] (17665) Provided verbal/tactile cueing for activities related to strengthening, flexibility, endurance, ROM  for improvements in scapular, scapulothoracic and UE control with self care, reaching, carrying, lifting, house/yardwork, driving/computer work. [x] (93427) Provided verbal/tactile cueing for activities related to improving balance, coordination, kinesthetic sense, posture, motor skill, proprioception  to assist with  scapular, scapulothoracic and UE control with self care, reaching, carrying, lifting, house/yardwork, driving/computer work. Therapeutic Activities:    [x] (38301 or 78398) Provided verbal/tactile cueing for activities related to improving balance, coordination, kinesthetic sense, posture, motor skill, proprioception and motor activation to allow for proper function of scapular, scapulothoracic and UE control with self care, carrying, lifting, driving/computer work.      Home Exercise Program:    [x] (12125) Reviewed/Progressed HEP activities related to strengthening, flexibility, endurance, ROM of scapular, scapulothoracic and UE control with self care, reaching, carrying, lifting, house/yardwork, driving/computer work  [x] (07525) Reviewed/Progressed HEP activities related to improving balance, coordination, kinesthetic sense, posture, motor skill, proprioception of scapular, scapulothoracic and UE control with self care, reaching, carrying, lifting,

## 2019-09-12 ENCOUNTER — HOSPITAL ENCOUNTER (OUTPATIENT)
Dept: PHYSICAL THERAPY | Age: 34
Setting detail: THERAPIES SERIES
Discharge: HOME | End: 2019-09-12
Payer: COMMERCIAL

## 2019-09-12 ENCOUNTER — OFFICE VISIT (OUTPATIENT)
Dept: ORTHOPEDIC SURGERY | Age: 34
End: 2019-09-12
Payer: COMMERCIAL

## 2019-09-12 VITALS — HEIGHT: 65 IN | BODY MASS INDEX: 35 KG/M2 | WEIGHT: 210.1 LBS

## 2019-09-12 DIAGNOSIS — S43.005D DISLOCATION OF LEFT SHOULDER JOINT, SUBSEQUENT ENCOUNTER: ICD-10-CM

## 2019-09-12 DIAGNOSIS — S42.255D CLOSED NONDISPLACED FRACTURE OF GREATER TUBEROSITY OF LEFT HUMERUS WITH ROUTINE HEALING, SUBSEQUENT ENCOUNTER: Primary | ICD-10-CM

## 2019-09-12 PROCEDURE — 97110 THERAPEUTIC EXERCISES: CPT

## 2019-09-12 PROCEDURE — 97140 MANUAL THERAPY 1/> REGIONS: CPT

## 2019-09-12 PROCEDURE — 97112 NEUROMUSCULAR REEDUCATION: CPT

## 2019-09-12 PROCEDURE — 99213 OFFICE O/P EST LOW 20 MIN: CPT | Performed by: ORTHOPAEDIC SURGERY

## 2019-09-12 NOTE — FLOWSHEET NOTE
STANDING        Rows 2x15 GTB 15x2 GTB 15x2 Gray TB --    IR ball behind back  X30, 2# X30, 2# X30, 2#    ER ball behind head  X20, 2# X30, softball X30, softball    Wall walks 5''x10 -- --     Extension 2x15 GTB 15x2 GTB 15x2 GTB     Wall ball circles  x20 ea, 2# x20 ea, 2#     Saw ball  Flex  Abd 2x10 10x3 10x3   10x3  10x3    Counter top weight shifts  1' 1' --    Shoulder depressions  -- -- --    Wall push ups 2x10 10x2 -- --    Cane across back/IR   x20 --    Quick ball drops   20x2 ea softball x30 ea, softball              Manual: PROM into shoulder flexion, abduction, ER and IR in neutral, 45° and 60° and 90° abduction    Therapeutic Exercise and NMR EXR  [x] (46289) Provided verbal/tactile cueing for activities related to strengthening, flexibility, endurance, ROM  for improvements in scapular, scapulothoracic and UE control with self care, reaching, carrying, lifting, house/yardwork, driving/computer work. [x] (59253) Provided verbal/tactile cueing for activities related to improving balance, coordination, kinesthetic sense, posture, motor skill, proprioception  to assist with  scapular, scapulothoracic and UE control with self care, reaching, carrying, lifting, house/yardwork, driving/computer work. Therapeutic Activities:    [x] (95767 or 05576) Provided verbal/tactile cueing for activities related to improving balance, coordination, kinesthetic sense, posture, motor skill, proprioception and motor activation to allow for proper function of scapular, scapulothoracic and UE control with self care, carrying, lifting, driving/computer work.      Home Exercise Program:    [x] (95821) Reviewed/Progressed HEP activities related to strengthening, flexibility, endurance, ROM of scapular, scapulothoracic and UE control with self care, reaching, carrying, lifting, house/yardwork, driving/computer work  [x] (32533) Reviewed/Progressed HEP activities related to improving balance, coordination, Progression is slowed due to complexities listed. [] Progression has been slowed due to co-morbidities. [] Plan just implemented, too soon to assess goals progression  [] Other:       ASSESSMENT: Erica Cabrera did well with session.  LPS did seems to help with 90/90 ER ROM         Treatment/Activity Tolerance:  [x] Patient tolerated treatment well [] Patient limited by fatique  [] Patient limited by pain  [] Patient limited by other medical complications  [] Other:     Prognosis: [x] Good [] Fair  [] Poor    Patient Requires Follow-up: [x] Yes  [] No    PLAN: See eval  [x] Continue per plan of care [] Alter current plan (see comments)  [] Plan of care initiated [] Hold pending MD visit [] Discharge    Electronically signed by: Robert Rowland PT,DPT

## 2019-09-12 NOTE — PROGRESS NOTES
Chief Complaint  Follow-up (Lt Shoulder: (S/P: Left Shoulder dislocation with a greater tuberosity fracture 5/27/19- 3 months out) Feels has improved since last visit, has current c/o decreased ROM but mprovement with strength, no c/o feeling weak)      History of Present Illness:  Yolette Soriano is a 35 y.o. y/o female who presents today for follow up of her left shoulder. She is now 3 months out from a left shoulder dislocation with subsequent greater tuberosity fracture treated nonsurgically. She has been in physical therapy since her last visit and has had improvement in her symptoms. She can now raise her arm up, but still has some stiffness. She has minimal pain. She denies any significant weakness today. She feels like she can get back to her normal activities.       Medical History  Past Medical History:   Diagnosis Date    Polycystic ovarian syndrome        Past Surgical History:   Procedure Laterality Date    CYST REMOVAL         Social History     Socioeconomic History    Marital status: Single     Spouse name: Not on file    Number of children: Not on file    Years of education: Not on file    Highest education level: Not on file   Occupational History    Not on file   Social Needs    Financial resource strain: Not on file    Food insecurity:     Worry: Not on file     Inability: Not on file    Transportation needs:     Medical: Not on file     Non-medical: Not on file   Tobacco Use    Smoking status: Never Smoker    Smokeless tobacco: Never Used   Substance and Sexual Activity    Alcohol use: Yes     Comment: weekly    Drug use: No    Sexual activity: Not on file   Lifestyle    Physical activity:     Days per week: Not on file     Minutes per session: Not on file    Stress: Not on file   Relationships    Social connections:     Talks on phone: Not on file     Gets together: Not on file     Attends [employer] service: Not on file     Active member of club or organization: Not on file

## 2019-09-17 ENCOUNTER — HOSPITAL ENCOUNTER (OUTPATIENT)
Dept: PHYSICAL THERAPY | Age: 34
Setting detail: THERAPIES SERIES
Discharge: HOME | End: 2019-09-17
Payer: COMMERCIAL

## 2019-09-17 ENCOUNTER — APPOINTMENT (OUTPATIENT)
Dept: CT IMAGING | Age: 34
DRG: 690 | End: 2019-09-17
Payer: COMMERCIAL

## 2019-09-17 ENCOUNTER — HOSPITAL ENCOUNTER (INPATIENT)
Age: 34
LOS: 1 days | Discharge: HOME | DRG: 690 | End: 2019-09-18
Attending: EMERGENCY MEDICINE | Admitting: INTERNAL MEDICINE
Payer: COMMERCIAL

## 2019-09-17 DIAGNOSIS — N12 PYELONEPHRITIS: Primary | ICD-10-CM

## 2019-09-17 DIAGNOSIS — N20.1 CALCULUS OF DISTAL RIGHT URETER: ICD-10-CM

## 2019-09-17 DIAGNOSIS — N20.0 NEPHROLITHIASIS: ICD-10-CM

## 2019-09-17 LAB
A/G RATIO: 1.3 (ref 1.1–2.2)
ALBUMIN SERPL-MCNC: 4.4 G/DL (ref 3.4–5)
ALP BLD-CCNC: 54 U/L (ref 40–129)
ALT SERPL-CCNC: 19 U/L (ref 10–40)
ANION GAP SERPL CALCULATED.3IONS-SCNC: 11 MMOL/L (ref 3–16)
AST SERPL-CCNC: 15 U/L (ref 15–37)
BACTERIA: ABNORMAL /HPF
BASOPHILS ABSOLUTE: 0.1 K/UL (ref 0–0.2)
BASOPHILS RELATIVE PERCENT: 0.6 %
BILIRUB SERPL-MCNC: 0.3 MG/DL (ref 0–1)
BILIRUBIN URINE: NEGATIVE
BLOOD, URINE: NEGATIVE
BUN BLDV-MCNC: 10 MG/DL (ref 7–20)
CALCIUM SERPL-MCNC: 9.8 MG/DL (ref 8.3–10.6)
CHLORIDE BLD-SCNC: 100 MMOL/L (ref 99–110)
CLARITY: CLEAR
CO2: 25 MMOL/L (ref 21–32)
COLOR: ABNORMAL
CREAT SERPL-MCNC: 0.9 MG/DL (ref 0.6–1.1)
EOSINOPHILS ABSOLUTE: 0.2 K/UL (ref 0–0.6)
EOSINOPHILS RELATIVE PERCENT: 1.5 %
EPITHELIAL CELLS, UA: ABNORMAL /HPF
GFR AFRICAN AMERICAN: >60
GFR NON-AFRICAN AMERICAN: >60
GLOBULIN: 3.5 G/DL
GLUCOSE BLD-MCNC: 113 MG/DL (ref 70–99)
GLUCOSE URINE: 250 MG/DL
HCG(URINE) PREGNANCY TEST: NEGATIVE
HCT VFR BLD CALC: 40.1 % (ref 36–48)
HEMOGLOBIN: 13.1 G/DL (ref 12–16)
KETONES, URINE: ABNORMAL MG/DL
LEUKOCYTE ESTERASE, URINE: ABNORMAL
LYMPHOCYTES ABSOLUTE: 2.9 K/UL (ref 1–5.1)
LYMPHOCYTES RELATIVE PERCENT: 27.1 %
MCH RBC QN AUTO: 28.1 PG (ref 26–34)
MCHC RBC AUTO-ENTMCNC: 32.7 G/DL (ref 31–36)
MCV RBC AUTO: 86 FL (ref 80–100)
MICROSCOPIC EXAMINATION: YES
MONOCYTES ABSOLUTE: 0.7 K/UL (ref 0–1.3)
MONOCYTES RELATIVE PERCENT: 6.6 %
NEUTROPHILS ABSOLUTE: 6.9 K/UL (ref 1.7–7.7)
NEUTROPHILS RELATIVE PERCENT: 64.2 %
NITRITE, URINE: POSITIVE
PDW BLD-RTO: 14.4 % (ref 12.4–15.4)
PH UA: 5 (ref 5–8)
PLATELET # BLD: 348 K/UL (ref 135–450)
PMV BLD AUTO: 6.8 FL (ref 5–10.5)
POTASSIUM REFLEX MAGNESIUM: 4.1 MMOL/L (ref 3.5–5.1)
PROTEIN UA: 100 MG/DL
RBC # BLD: 4.66 M/UL (ref 4–5.2)
RBC UA: ABNORMAL /HPF (ref 0–2)
SODIUM BLD-SCNC: 136 MMOL/L (ref 136–145)
SPECIFIC GRAVITY UA: 1.02 (ref 1–1.03)
TOTAL PROTEIN: 7.9 G/DL (ref 6.4–8.2)
URINE TYPE: ABNORMAL
UROBILINOGEN, URINE: >=8 E.U./DL
WBC # BLD: 10.8 K/UL (ref 4–11)
WBC UA: ABNORMAL /HPF (ref 0–5)

## 2019-09-17 PROCEDURE — 97140 MANUAL THERAPY 1/> REGIONS: CPT

## 2019-09-17 PROCEDURE — 81001 URINALYSIS AUTO W/SCOPE: CPT

## 2019-09-17 PROCEDURE — 85025 COMPLETE CBC W/AUTO DIFF WBC: CPT

## 2019-09-17 PROCEDURE — 80053 COMPREHEN METABOLIC PANEL: CPT

## 2019-09-17 PROCEDURE — 96374 THER/PROPH/DIAG INJ IV PUSH: CPT

## 2019-09-17 PROCEDURE — 74176 CT ABD & PELVIS W/O CONTRAST: CPT

## 2019-09-17 PROCEDURE — 84703 CHORIONIC GONADOTROPIN ASSAY: CPT

## 2019-09-17 PROCEDURE — 6360000002 HC RX W HCPCS: Performed by: NURSE PRACTITIONER

## 2019-09-17 PROCEDURE — 2580000003 HC RX 258: Performed by: NURSE PRACTITIONER

## 2019-09-17 PROCEDURE — 97112 NEUROMUSCULAR REEDUCATION: CPT

## 2019-09-17 PROCEDURE — 87086 URINE CULTURE/COLONY COUNT: CPT

## 2019-09-17 PROCEDURE — 99285 EMERGENCY DEPT VISIT HI MDM: CPT

## 2019-09-17 PROCEDURE — 96375 TX/PRO/DX INJ NEW DRUG ADDON: CPT

## 2019-09-17 PROCEDURE — 97110 THERAPEUTIC EXERCISES: CPT

## 2019-09-17 RX ORDER — MORPHINE SULFATE 4 MG/ML
4 INJECTION, SOLUTION INTRAMUSCULAR; INTRAVENOUS ONCE
Status: COMPLETED | OUTPATIENT
Start: 2019-09-17 | End: 2019-09-17

## 2019-09-17 RX ORDER — KETOROLAC TROMETHAMINE 30 MG/ML
30 INJECTION, SOLUTION INTRAMUSCULAR; INTRAVENOUS ONCE
Status: COMPLETED | OUTPATIENT
Start: 2019-09-17 | End: 2019-09-17

## 2019-09-17 RX ORDER — 0.9 % SODIUM CHLORIDE 0.9 %
1000 INTRAVENOUS SOLUTION INTRAVENOUS ONCE
Status: COMPLETED | OUTPATIENT
Start: 2019-09-17 | End: 2019-09-18

## 2019-09-17 RX ORDER — NITROFURANTOIN MACROCRYSTALS 100 MG/1
100 CAPSULE ORAL 2 TIMES DAILY
Status: ON HOLD | COMMUNITY
End: 2019-09-18 | Stop reason: HOSPADM

## 2019-09-17 RX ORDER — ONDANSETRON 2 MG/ML
4 INJECTION INTRAMUSCULAR; INTRAVENOUS ONCE
Status: COMPLETED | OUTPATIENT
Start: 2019-09-17 | End: 2019-09-17

## 2019-09-17 RX ADMIN — SODIUM CHLORIDE 1000 ML: 9 INJECTION, SOLUTION INTRAVENOUS at 22:39

## 2019-09-17 RX ADMIN — ONDANSETRON 4 MG: 2 INJECTION INTRAMUSCULAR; INTRAVENOUS at 22:37

## 2019-09-17 RX ADMIN — KETOROLAC TROMETHAMINE 30 MG: 30 INJECTION, SOLUTION INTRAMUSCULAR at 22:37

## 2019-09-17 RX ADMIN — MORPHINE SULFATE 4 MG: 4 INJECTION, SOLUTION INTRAMUSCULAR; INTRAVENOUS at 22:37

## 2019-09-17 ASSESSMENT — PAIN SCALES - GENERAL
PAINLEVEL_OUTOF10: 7
PAINLEVEL_OUTOF10: 8

## 2019-09-17 ASSESSMENT — PAIN DESCRIPTION - FREQUENCY: FREQUENCY: CONTINUOUS

## 2019-09-17 ASSESSMENT — PAIN DESCRIPTION - PAIN TYPE: TYPE: ACUTE PAIN

## 2019-09-17 ASSESSMENT — PAIN DESCRIPTION - ONSET: ONSET: ON-GOING

## 2019-09-17 ASSESSMENT — PAIN DESCRIPTION - LOCATION: LOCATION: FLANK

## 2019-09-17 ASSESSMENT — PAIN DESCRIPTION - ORIENTATION: ORIENTATION: RIGHT

## 2019-09-17 ASSESSMENT — PAIN DESCRIPTION - PROGRESSION: CLINICAL_PROGRESSION: GRADUALLY WORSENING

## 2019-09-17 NOTE — FLOWSHEET NOTE
[address] [place], [address]                Physical Therapy Daily Treatment Note  Date:  2019    Patient Name:  Raymundo Gay    :  1985  MRN: [id number]  Restrictions/Precautions:  No resistance  Medical/Treatment Diagnosis Information:  · Diagnosis: Closed nondisplaced fracture of greater tuberosity of left humerus  · Treatment Diagnosis: Left shoulder pain  Insurance/Certification information:  Med mutual  Physician Information:  Zoie Driscoll MD  Plan of care signed (Y/N): Y      Date of Patient follow up with Physician:      Assessment Summary: Raciel Milan is a 35 y.o. female reporting to OP PT with c/c of left shoulder pain and stiffness which has been occurring since fall on Memorial day. Pt is noted to have significant reduction in AROM and PROM. Strength testing was deferred. Progress Note: []  Yes  [x]  No       Latex Allergy:  [x]NO      []YES  Preferred Language for Healthcare:   [x]English       []other:    Visit # Insurance Allowable    40     Pain level:  2/10     SUBJECTIVE: Pt states shoulder is stiff but is going back to work tomorrow.      OBJECTIVE:   PROM:   Flexion 160    AROM:  Flexion 130  Abduction 130  ER 45  IR Sacrum    MMT:  Flexion 5/5  Abduction 5/5  ER 5/5  IR 5/5      RESTRICTIONS/PRECAUTIONS: No resistance    Exercises/Interventions:   Exercise 19    Warm-up        Pulleys 5' 5' 5' 5'    UBE   3'/3', Lv 6 3'/3' Lv 5            TABLE        Supine cane flexion -- -- -- --    SP X30, 3# X30, 3# -- --    Concentric circles X30, ## Xx30, 3# -- --    ABCs -- X1, 30# -- --    SL ER 10x3, 2# -- -- --    SL abduction 10x2, 2# -- -- --    90/90 ER LLPS -- -- 5', 2# 5', 2#    Flexion LLPS    3', 3#    90/90 IR/ER -- X30, 2# --     Prone HA    10x3, 1#                            SEATED                                STANDING        Rows

## 2019-09-18 VITALS
RESPIRATION RATE: 15 BRPM | OXYGEN SATURATION: 99 % | HEART RATE: 75 BPM | HEIGHT: 66 IN | WEIGHT: 219 LBS | SYSTOLIC BLOOD PRESSURE: 119 MMHG | DIASTOLIC BLOOD PRESSURE: 78 MMHG | TEMPERATURE: 98.1 F | BODY MASS INDEX: 35.2 KG/M2

## 2019-09-18 PROBLEM — N12 PYELONEPHRITIS: Status: ACTIVE | Noted: 2019-09-18

## 2019-09-18 PROBLEM — N20.1 CALCULUS OF DISTAL RIGHT URETER: Status: ACTIVE | Noted: 2019-09-18

## 2019-09-18 PROCEDURE — 96372 THER/PROPH/DIAG INJ SC/IM: CPT

## 2019-09-18 PROCEDURE — 1200000000 HC SEMI PRIVATE

## 2019-09-18 PROCEDURE — 6360000002 HC RX W HCPCS: Performed by: NURSE PRACTITIONER

## 2019-09-18 PROCEDURE — 6370000000 HC RX 637 (ALT 250 FOR IP): Performed by: INTERNAL MEDICINE

## 2019-09-18 PROCEDURE — 2580000003 HC RX 258: Performed by: NURSE PRACTITIONER

## 2019-09-18 PROCEDURE — 96376 TX/PRO/DX INJ SAME DRUG ADON: CPT

## 2019-09-18 PROCEDURE — 6370000000 HC RX 637 (ALT 250 FOR IP): Performed by: NURSE PRACTITIONER

## 2019-09-18 RX ORDER — SODIUM CHLORIDE 0.9 % (FLUSH) 0.9 %
10 SYRINGE (ML) INJECTION PRN
Status: DISCONTINUED | OUTPATIENT
Start: 2019-09-18 | End: 2019-09-18 | Stop reason: HOSPADM

## 2019-09-18 RX ORDER — SODIUM CHLORIDE 9 MG/ML
INJECTION, SOLUTION INTRAVENOUS
Status: DISPENSED
Start: 2019-09-18 | End: 2019-09-18

## 2019-09-18 RX ORDER — SODIUM CHLORIDE 9 MG/ML
INJECTION, SOLUTION INTRAVENOUS CONTINUOUS
Status: DISCONTINUED | OUTPATIENT
Start: 2019-09-18 | End: 2019-09-18 | Stop reason: HOSPADM

## 2019-09-18 RX ORDER — OXYCODONE HYDROCHLORIDE AND ACETAMINOPHEN 5; 325 MG/1; MG/1
1 TABLET ORAL EVERY 4 HOURS PRN
Qty: 8 TABLET | Refills: 0 | Status: SHIPPED | OUTPATIENT
Start: 2019-09-18 | End: 2019-09-21

## 2019-09-18 RX ORDER — TAMSULOSIN HYDROCHLORIDE 0.4 MG/1
0.4 CAPSULE ORAL DAILY
Status: DISCONTINUED | OUTPATIENT
Start: 2019-09-18 | End: 2019-09-18

## 2019-09-18 RX ORDER — MORPHINE SULFATE 4 MG/ML
4 INJECTION, SOLUTION INTRAMUSCULAR; INTRAVENOUS ONCE
Status: COMPLETED | OUTPATIENT
Start: 2019-09-18 | End: 2019-09-18

## 2019-09-18 RX ORDER — MORPHINE SULFATE 2 MG/ML
4 INJECTION, SOLUTION INTRAMUSCULAR; INTRAVENOUS
Status: DISCONTINUED | OUTPATIENT
Start: 2019-09-18 | End: 2019-09-18 | Stop reason: HOSPADM

## 2019-09-18 RX ORDER — MORPHINE SULFATE 2 MG/ML
2 INJECTION, SOLUTION INTRAMUSCULAR; INTRAVENOUS
Status: DISCONTINUED | OUTPATIENT
Start: 2019-09-18 | End: 2019-09-18 | Stop reason: HOSPADM

## 2019-09-18 RX ORDER — CIPROFLOXACIN 500 MG/1
500 TABLET, FILM COATED ORAL 2 TIMES DAILY
Qty: 12 TABLET | Refills: 0 | Status: SHIPPED | OUTPATIENT
Start: 2019-09-18 | End: 2019-09-24

## 2019-09-18 RX ORDER — PROMETHAZINE HYDROCHLORIDE 25 MG/ML
25 INJECTION, SOLUTION INTRAMUSCULAR; INTRAVENOUS ONCE
Status: COMPLETED | OUTPATIENT
Start: 2019-09-18 | End: 2019-09-18

## 2019-09-18 RX ORDER — SODIUM CHLORIDE 0.9 % (FLUSH) 0.9 %
10 SYRINGE (ML) INJECTION EVERY 12 HOURS SCHEDULED
Status: DISCONTINUED | OUTPATIENT
Start: 2019-09-18 | End: 2019-09-18 | Stop reason: HOSPADM

## 2019-09-18 RX ORDER — ONDANSETRON 2 MG/ML
4 INJECTION INTRAMUSCULAR; INTRAVENOUS EVERY 6 HOURS PRN
Status: DISCONTINUED | OUTPATIENT
Start: 2019-09-18 | End: 2019-09-18 | Stop reason: HOSPADM

## 2019-09-18 RX ORDER — OXYCODONE HYDROCHLORIDE AND ACETAMINOPHEN 5; 325 MG/1; MG/1
1 TABLET ORAL EVERY 4 HOURS PRN
Status: DISCONTINUED | OUTPATIENT
Start: 2019-09-18 | End: 2019-09-18 | Stop reason: HOSPADM

## 2019-09-18 RX ADMIN — TAMSULOSIN HYDROCHLORIDE 0.4 MG: 0.4 CAPSULE ORAL at 02:34

## 2019-09-18 RX ADMIN — MORPHINE SULFATE 4 MG: 4 INJECTION, SOLUTION INTRAMUSCULAR; INTRAVENOUS at 01:03

## 2019-09-18 RX ADMIN — SODIUM CHLORIDE: 9 INJECTION, SOLUTION INTRAVENOUS at 01:47

## 2019-09-18 RX ADMIN — PROMETHAZINE HYDROCHLORIDE 25 MG: 25 INJECTION INTRAMUSCULAR; INTRAVENOUS at 01:03

## 2019-09-18 RX ADMIN — CEFTRIAXONE SODIUM 1 G: 1 INJECTION, POWDER, FOR SOLUTION INTRAMUSCULAR; INTRAVENOUS at 01:11

## 2019-09-18 RX ADMIN — OXYCODONE HYDROCHLORIDE AND ACETAMINOPHEN 1 TABLET: 5; 325 TABLET ORAL at 14:41

## 2019-09-18 ASSESSMENT — PAIN SCALES - GENERAL
PAINLEVEL_OUTOF10: 8
PAINLEVEL_OUTOF10: 6
PAINLEVEL_OUTOF10: 6
PAINLEVEL_OUTOF10: 2

## 2019-09-18 ASSESSMENT — PAIN DESCRIPTION - DESCRIPTORS: DESCRIPTORS: SHARP

## 2019-09-18 ASSESSMENT — PAIN DESCRIPTION - ORIENTATION: ORIENTATION: RIGHT;LOWER

## 2019-09-18 ASSESSMENT — PAIN DESCRIPTION - LOCATION: LOCATION: ABDOMEN;BACK

## 2019-09-18 ASSESSMENT — PAIN DESCRIPTION - PAIN TYPE: TYPE: ACUTE PAIN

## 2019-09-18 NOTE — PROGRESS NOTES
4 Eyes Skin Assessment     The patient is being assess for     admission  I agree that 2 RN's have performed a thorough Head to Toe Skin Assessment on the patient. ALL assessment sites listed below have been assessed.       Areas assessed by both nurses:   [x]   Head, Face, and Ears   [x]   Shoulders, Back, and Chest, Abdomen  [x]   Arms, Elbows, and Hands   [x]   Coccyx, Sacrum, and Ischium  [x]   Legs, Feet, and Heels          **SHARE this note so that the co-signing nurse is able to place an eSignature**    Co-signer eSignature: Electronically signed by Sanjana Lester RN on 9/18/19 at 6:56 AM    Does the Patient have Skin Breakdown?  none          Murphy Prevention initiated: no   Wound Care Orders initiated:  no}      Anuel Grubbs consulted for Pressure Injury (Stage 3,4, Unstageable, DTI, NWPT, Complex wounds)and New or Established Ostomies:  {no     Primary Nurse eSignature: Electronically signed by Nate Kumari RN on 9/18/19 at 4:36 AM

## 2019-09-18 NOTE — ED PROVIDER NOTES
Never Used   Substance and Sexual Activity    Alcohol use: Yes     Comment: occ    Drug use: No    Sexual activity: Not on file   Lifestyle    Physical activity:     Days per week: Not on file     Minutes per session: Not on file    Stress: Not on file   Relationships    Social connections:     Talks on phone: Not on file     Gets together: Not on file     Attends [employer] service: Not on file     Active member of club or organization: Not on file     Attends meetings of clubs or organizations: Not on file     Relationship status: Not on file    Intimate partner violence:     Fear of current or ex partner: Not on file     Emotionally abused: Not on file     Physically abused: Not on file     Forced sexual activity: Not on file   Other Topics Concern    Not on file   Social History Narrative    Not on file     Current Facility-Administered Medications   Medication Dose Route Frequency Provider Last Rate Last Dose    promethazine (PHENERGAN) injection 25 mg  25 mg Intramuscular Once Nieves A Burriss, APRN - CNP        morphine injection 4 mg  4 mg Intravenous Once Nieves A Burriss, APRN - CNP        cefTRIAXone (ROCEPHIN) 1 g IVPB in 50 mL D5W minibag  1 g Intravenous Once Nieves A Burriss, APRN - CNP         Current Outpatient Medications   Medication Sig Dispense Refill    nitrofurantoin (MACRODANTIN) 100 MG capsule Take 100 mg by mouth 4 times daily      Pumpkin Seed-Soy Germ (AZO BLADDER CONTROL/GO-LESS PO) Take by mouth       No Known Allergies    REVIEW OF SYSTEMS  10 systems reviewed, pertinent positives per HPI otherwise noted to be negative    PHYSICAL EXAM  /89   Pulse 86   Temp 98.3 °F (36.8 °C)   Resp 16   Ht 5' 6\" (1.676 m)   Wt 219 lb (99.3 kg)   SpO2 100%   BMI 35.35 kg/m²   GENERAL APPEARANCE: Awake and alert. Cooperative. No acute distress. Vital signs are stable. Well appearing and non toxic. HEAD: Normocephalic. Atraumatic. EYES: PERRL. EOM's grossly intact.    ENT: Mucous limited evaluation due to absence of oral contrast. The stomach shows no focal lesions. Small bowel loops normal in caliber showing no focal abnormalities. Normal appendix. Minor diverticulosis. Evaluation of the colon shows no acute process. Pelvis: Uterus present. Hyperdensities in the lower uterine segment likely nabothian cysts. Urinary bladder is collapsed and not optimally assessed. Peritoneum/Retroperitoneum: There is 3 mm nonobstructing left renal calculus and 2 mm left renal calculus lower pole. On the right side there is significant perinephric stranding and mild hydronephrosis. A few punctate nonobstructing intrarenal right renal calculi are noted. There is mild dilatation of the right ureter. There is punctate calculus in the distal right ureter about a cm proximal to the uterovesical junction accounting for the obstruction. No free intraperitoneal fluid or significant lymphadenopathy. Bones/Soft Tissues: No acute abnormality of the bones. The superficial soft tissues show no significant abnormalities. 1. Punctate obstructing calculus in the distal right ureter about a cm proximal to the uterovesical junction causing mild hydronephrosis and hydroureter with significant perinephric stranding. 2. Bilateral nonobstructing intrarenal sub 5 mm calculi. 3. Cholelithiasis with several stones noted. These include 8 mm stone the gallbladder neck. ED COURSE/MDM  Patient seen and evaluated. Old records reviewed. Diagnostic testing reviewed and results discussed. I have seen this patient in collaboration with supervising physician Dr. Michelle Whitman. We thoroughly discussed the history, physical exam, diagnostic testing and emergency department course. Thnoy Doan presented to the ED today with above noted complaints. Urinalysis reveals positive nitrites and trace leukocytes. CBC and CMP are unremarkable no acute kidney injury, leukocytosis, bandemia, anemia.   CT of the abdomen and pelvis Negative    Microscopic Examination YES     Urine Type Not Specified    Comprehensive Metabolic Panel w/ Reflex to MG   Result Value Ref Range    Sodium 136 136 - 145 mmol/L    Potassium reflex Magnesium 4.1 3.5 - 5.1 mmol/L    Chloride 100 99 - 110 mmol/L    CO2 25 21 - 32 mmol/L    Anion Gap 11 3 - 16    Glucose 113 (H) 70 - 99 mg/dL    BUN 10 7 - 20 mg/dL    CREATININE 0.9 0.6 - 1.1 mg/dL    GFR Non-African American >60 >60    GFR African American >60 >60    Calcium 9.8 8.3 - 10.6 mg/dL    Total Protein 7.9 6.4 - 8.2 g/dL    Alb 4.4 3.4 - 5.0 g/dL    Albumin/Globulin Ratio 1.3 1.1 - 2.2    Total Bilirubin 0.3 0.0 - 1.0 mg/dL    Alkaline Phosphatase 54 40 - 129 U/L    ALT 19 10 - 40 U/L    AST 15 15 - 37 U/L    Globulin 3.5 g/dL   CBC Auto Differential   Result Value Ref Range    WBC 10.8 4.0 - 11.0 K/uL    RBC 4.66 4.00 - 5.20 M/uL    Hemoglobin 13.1 12.0 - 16.0 g/dL    Hematocrit 40.1 36.0 - 48.0 %    MCV 86.0 80.0 - 100.0 fL    MCH 28.1 26.0 - 34.0 pg    MCHC 32.7 31.0 - 36.0 g/dL    RDW 14.4 12.4 - 15.4 %    Platelets 434 091 - 716 K/uL    MPV 6.8 5.0 - 10.5 fL    Neutrophils % 64.2 %    Lymphocytes % 27.1 %    Monocytes % 6.6 %    Eosinophils % 1.5 %    Basophils % 0.6 %    Neutrophils Absolute 6.9 1.7 - 7.7 K/uL    Lymphocytes Absolute 2.9 1.0 - 5.1 K/uL    Monocytes Absolute 0.7 0.0 - 1.3 K/uL    Eosinophils Absolute 0.2 0.0 - 0.6 K/uL    Basophils Absolute 0.1 0.0 - 0.2 K/uL   Pregnancy, Urine   Result Value Ref Range    HCG(Urine) Pregnancy Test Negative Detects HCG level >20 MIU/mL   Microscopic Urinalysis   Result Value Ref Range    WBC, UA 0-2 0 - 5 /HPF    RBC, UA None seen 0 - 2 /HPF    Epi Cells 3-5 /HPF    Bacteria, UA Rare (A) /HPF       I spoke with Nigel ROJAS. We thoroughly discussed the history, physical exam, laboratory and imaging studies, as well as, emergency department course.  Based upon that discussion, we've decided to admit Marita Delgado for further observation and evaluation of Kaity PARKER Roman's abdominal pain. As I have deemed necessary from their history, physical and studies, I have considered and evaluated Stephany Sesay for the following diagnoses:  ACUTE APPENDICITIS, BOWEL OBSTRUCTION, CHOLECYSTITIS, DIVERTICULITIS, INCARCERATED HERNIA, PANCREATITIS, or PERFORATED BOWEL or ULCER. FINAL IMPRESSION  1. Pyelonephritis    2. Nephrolithiasis    3. Calculus of distal right ureter        Vitals:  Blood pressure 129/89, pulse 86, temperature 98.3 °F (36.8 °C), resp. rate 16, height 5' 6\" (1.676 m), weight 219 lb (99.3 kg), SpO2 100 %, not currently breastfeeding. DISPOSITION  Patient was admitted to the hospital in stable condition. Comment: Please note this report has been produced using speech recognition software and may contain errors related to that system including errors in grammar, punctuation, and spelling, as well as words and phrases that may be inappropriate. If there are any questions or concerns please feel free to contact the dictating provider for clarification.             [id number] Elie Ronquillo, APRN - [facility]  09/18/19 [id number]

## 2019-09-18 NOTE — CONSULTS
Urology Consult Note      Reason for Consult:  Right distal stone    History:   Pt is a 36 yo WF admitted for right flank pain and possible UTI. She was recently seen at a [facility] clinic and started on abx. CT showed a punctate stone in the distal ureter with hydronephrosis and stranding. UA nitrite positive, but micro unremarkable. WBC, Cr WNL. AVSS. Flank pain has resolved, her only c/o is bladder pressure. Meds: see med rec  Family History, Social History, Review of Systems:  Reviewed and agreed to as per chart    Exam:    Vitals:  /71   Pulse 79   Temp 97.7 °F (36.5 °C) (Oral)   Resp 18   Ht 5' 6\" (1.676 m)   Wt 219 lb (99.3 kg)   LMP 2019   SpO2 99%   Breastfeeding? No   BMI 35.35 kg/m²   Temp  Av.1 °F (36.7 °C)  Min: 97.7 °F (36.5 °C)  Max: 98.5 °F (36.9 °C)    Intake/Output Summary (Last 24 hours) at 2019 1050  Last data filed at 2019 0541  Gross per 24 hour   Intake 487.5 ml   Output 600 ml   Net -112.5 ml        Physical:   Well developed, well nourished in no acute distress   Mood indicates no abnormalities. Pt doesnt appear depressed   Orientated to time and place   Neck is supple, trachea is midline   Respiratory effort is normal   Cardiovascular show no extremity swelling   Abdomen no masses or hernias are palpated, there is no tenderness. Liver and Spleen appear normal.   Skin show no abnormal lesions   Lymph nodes are not palpated in the inguinal, neck, or axillary area.         Labs:  WBC:    Lab Results   Component Value Date    WBC 10.8 2019     Hemoglobin/Hematocrit:    Lab Results   Component Value Date    HGB 13.1 2019    HCT 40.1 2019     BMP:    Lab Results   Component Value Date     2019    K 4.1 2019     2019    CO2 25 2019    BUN 10 2019    LABALBU 4.4 2019    CREATININE 0.9 2019    CALCIUM 9.8 2019    GFRAA >60 2019    LABGLOM >60 2019

## 2019-09-18 NOTE — CARE COORDINATION
CM met with pt at bedside as there is no PCP listed. 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 She confirms that she does not currently have a PCP. [facility] referral provided at bedside. Pt states that she will call to set up appointment herself closer to [place]. Pt denies any further needs.      Trinidad Contreras RN

## 2019-09-19 LAB — URINE CULTURE, ROUTINE: NORMAL

## 2019-09-24 ENCOUNTER — HOSPITAL ENCOUNTER (OUTPATIENT)
Dept: PHYSICAL THERAPY | Age: 34
Setting detail: THERAPIES SERIES
Discharge: HOME | End: 2019-09-24
Payer: COMMERCIAL

## 2019-09-24 PROCEDURE — 97112 NEUROMUSCULAR REEDUCATION: CPT

## 2019-09-24 PROCEDURE — 97140 MANUAL THERAPY 1/> REGIONS: CPT

## 2019-09-24 PROCEDURE — 97110 THERAPEUTIC EXERCISES: CPT

## 2019-09-24 NOTE — FLOWSHEET NOTE
[address] [place], [address]                Physical Therapy Daily Treatment Note  Date:  2019    Patient Name:  Jackelyn Cali    :  1985  MRN: [id number]  Restrictions/Precautions:  No resistance  Medical/Treatment Diagnosis Information:  · Diagnosis: Closed nondisplaced fracture of greater tuberosity of left humerus  · Treatment Diagnosis: Left shoulder pain  Insurance/Certification information:  Med mutual  Physician Information:  Saundra Smith MD  Plan of care signed (Y/N): Y      Date of Patient follow up with Physician:      Assessment Summary: Anaya is a 35 y.o. female reporting to OP PT with c/c of left shoulder pain and stiffness which has been occurring since fall on Memorial day. Pt is noted to have significant reduction in AROM and PROM. Strength testing was deferred. Progress Note: []  Yes  [x]  No       Latex Allergy:  [x]NO      []YES  Preferred Language for Healthcare:   [x]English       []other:    Visit # Insurance Allowable   13 40     Pain level:  0/10     SUBJECTIVE: Pt states she feel like she is getting better motion.      OBJECTIVE:   PROM:   Flexion 160    AROM:  Flexion 135  Abduction 135  ER 48  IR L1    MMT:  Flexion 5/5  Abduction 5/5  ER 5/5  IR 5/5      RESTRICTIONS/PRECAUTIONS: No resistance    Exercises/Interventions:   Exercise 19    Warm-up       Pulleys 5' 5' 6'    UBE 3'/3', Lv 6 3'/3' Lv 5 2'/2', Lv 7           TABLE       Supine cane flexion -- --     SP -- --     Concentric circles -- --     ABCs -- --     SL ER -- --     SL abduction -- --     90/90 ER LLPS 5', 2# 5', 2# 5', 3#    Flexion LLPS  3', 3# --    90/90 IR/ER --  --    Prone HA  10x3, 1# 10x3, 2#                         SEATED                            STANDING       Rows -- 10x3  BTB --    IR ball behind back X30, 2# X30, 2# X30, 2#    ER ball behind head X30, reaching, carrying, lifting, house/yardwork, driving/computer work      Manual Treatments:  PROM / STM / Oscillations-Mobs:  G-I, II, III, IV (PA's, Inf., Post.)  [x] (93790) Provided manual therapy to mobilize soft tissue/joints of cervical/CT, scapular GHJ and UE for the purpose of modulating pain, promoting relaxation,  increasing ROM, reducing/eliminating soft tissue swelling/inflammation/restriction, improving soft tissue extensibility and allowing for proper ROM for normal function with self care, reaching, carrying, lifting, house/yardwork, driving/computer work    Modalities:  None    Charges: 47/47;        GOALS:  Patient stated goal: Return to work/school, improving ROM     Therapist goals for Patient:   Short Term Goals: To be achieved in: 2 weeks  1. Independent in HEP and progression per patient tolerance, in order to prevent re-injury. MET  2. Patient will have a decrease in pain to facilitate improvement in movement, function, and ADLs as indicated by Functional Deficits. MET     Long Term Goals: To be achieved in: 6 weeks  1. Disability index score of 23% or less for the DASH to assist with reaching prior level of function. MET  2. Patient will demonstrate increased AROM Flex/abd/ER/IR to 145/145/40/sacrum to allow for proper joint functioning as indicated by patients Functional Deficits. NOT MET  3. Patient will demonstrate an increase in Strength to >=+4+/5 to allow for proper functional mobility as indicated by patients Functional Deficits. 4. Patient will return to reaching functional activities without increased symptoms or restriction. NOT MET  5. Pt will be able to return to work and or school (patient specific functional goal)  NOT MET       Progression Towards Functional goals:  [x] Patient is progressing as expected towards functional goals listed. [] Progression is slowed due to complexities listed. [] Progression has been slowed due to co-morbidities.   [] Plan just implemented, too

## 2019-09-26 ENCOUNTER — HOSPITAL ENCOUNTER (OUTPATIENT)
Age: 34
Discharge: HOME | End: 2019-09-26
Payer: COMMERCIAL

## 2019-09-26 ENCOUNTER — HOSPITAL ENCOUNTER (OUTPATIENT)
Dept: ULTRASOUND IMAGING | Age: 34
Discharge: HOME | End: 2019-09-26
Payer: COMMERCIAL

## 2019-09-26 ENCOUNTER — HOSPITAL ENCOUNTER (OUTPATIENT)
Dept: GENERAL RADIOLOGY | Age: 34
Discharge: HOME | End: 2019-09-26
Payer: COMMERCIAL

## 2019-09-26 DIAGNOSIS — N39.0 UTI (URINARY TRACT INFECTION), UNCOMPLICATED: ICD-10-CM

## 2019-09-26 DIAGNOSIS — N20.0 CALCULUS OF KIDNEY: ICD-10-CM

## 2019-09-26 DIAGNOSIS — N20.1 CALCULUS OF URETER: ICD-10-CM

## 2019-09-26 PROCEDURE — 74018 RADEX ABDOMEN 1 VIEW: CPT

## 2019-09-26 PROCEDURE — 76770 US EXAM ABDO BACK WALL COMP: CPT

## 2019-10-01 ENCOUNTER — HOSPITAL ENCOUNTER (OUTPATIENT)
Dept: PHYSICAL THERAPY | Age: 34
Setting detail: THERAPIES SERIES
Discharge: HOME | End: 2019-10-01
Payer: COMMERCIAL

## 2019-10-01 PROCEDURE — 97140 MANUAL THERAPY 1/> REGIONS: CPT

## 2019-10-01 PROCEDURE — 97112 NEUROMUSCULAR REEDUCATION: CPT

## 2019-10-01 PROCEDURE — 97110 THERAPEUTIC EXERCISES: CPT

## 2019-10-08 ENCOUNTER — HOSPITAL ENCOUNTER (OUTPATIENT)
Dept: PHYSICAL THERAPY | Age: 34
Setting detail: THERAPIES SERIES
Discharge: HOME | End: 2019-10-08
Payer: COMMERCIAL

## 2019-10-08 PROCEDURE — 97110 THERAPEUTIC EXERCISES: CPT

## 2019-10-08 PROCEDURE — 97112 NEUROMUSCULAR REEDUCATION: CPT

## 2019-10-08 PROCEDURE — 97140 MANUAL THERAPY 1/> REGIONS: CPT

## 2019-10-24 ENCOUNTER — OFFICE VISIT (OUTPATIENT)
Dept: ORTHOPEDIC SURGERY | Age: 34
End: 2019-10-24
Payer: COMMERCIAL

## 2019-10-24 VITALS — HEIGHT: 66 IN | BODY MASS INDEX: 35.18 KG/M2 | WEIGHT: 218.92 LBS

## 2019-10-24 DIAGNOSIS — S42.255D CLOSED NONDISPLACED FRACTURE OF GREATER TUBEROSITY OF LEFT HUMERUS WITH ROUTINE HEALING, SUBSEQUENT ENCOUNTER: Primary | ICD-10-CM

## 2019-10-24 PROCEDURE — 99213 OFFICE O/P EST LOW 20 MIN: CPT | Performed by: ORTHOPAEDIC SURGERY

## 2019-10-28 ENCOUNTER — TELEPHONE (OUTPATIENT)
Dept: ORTHOPEDIC SURGERY | Age: 34
End: 2019-10-28

## 2019-10-29 ENCOUNTER — TELEPHONE (OUTPATIENT)
Dept: ORTHOPEDIC SURGERY | Age: 34
End: 2019-10-29

## 2019-11-04 ENCOUNTER — TELEPHONE (OUTPATIENT)
Dept: ORTHOPEDIC SURGERY | Age: 34
End: 2019-11-04

## 2022-09-07 LAB
C. TRACHOMATIS, EXTERNAL RESULT: NEGATIVE
N. GONORRHOEAE, EXTERNAL RESULT: NEGATIVE

## 2022-09-21 LAB
ABO, EXTERNAL RESULT: NORMAL
HEP B, EXTERNAL RESULT: NEGATIVE
HEPATITIS C ANTIBODY, EXTERNAL RESULT: NEGATIVE
HIV, EXTERNAL RESULT: NORMAL
RH FACTOR, EXTERNAL RESULT: POSITIVE
RUBELLA TITER, EXTERNAL RESULT: NORMAL

## 2023-01-18 LAB — RPR, EXTERNAL RESULT: NORMAL

## 2023-03-08 LAB — GBS, EXTERNAL RESULT: NEGATIVE

## 2023-03-28 ENCOUNTER — HOSPITAL ENCOUNTER (INPATIENT)
Age: 38
LOS: 3 days | Discharge: HOME | End: 2023-03-31
Attending: OBSTETRICS & GYNECOLOGY | Admitting: OBSTETRICS & GYNECOLOGY
Payer: COMMERCIAL

## 2023-03-28 ENCOUNTER — APPOINTMENT (OUTPATIENT)
Dept: LABOR AND DELIVERY | Age: 38
End: 2023-03-28
Payer: COMMERCIAL

## 2023-03-28 ENCOUNTER — ANESTHESIA EVENT (OUTPATIENT)
Dept: LABOR AND DELIVERY | Age: 38
End: 2023-03-28
Payer: COMMERCIAL

## 2023-03-28 ENCOUNTER — ANESTHESIA (OUTPATIENT)
Dept: LABOR AND DELIVERY | Age: 38
End: 2023-03-28
Payer: COMMERCIAL

## 2023-03-28 PROBLEM — Z37.9 NORMAL LABOR: Status: ACTIVE | Noted: 2023-03-28

## 2023-03-28 LAB
ABO + RH BLD: NORMAL
AMPHETAMINES UR QL SCN>1000 NG/ML: NORMAL
BARBITURATES UR QL SCN>200 NG/ML: NORMAL
BASOPHILS # BLD: 0 K/UL (ref 0–0.2)
BASOPHILS NFR BLD: 0.4 %
BENZODIAZ UR QL SCN>200 NG/ML: NORMAL
BLD GP AB SCN SERPL QL: NORMAL
BUPRENORPHINE+NOR UR QL SCN: NORMAL
CANNABINOIDS UR QL SCN>50 NG/ML: NORMAL
COCAINE UR QL SCN: NORMAL
DEPRECATED RDW RBC AUTO: 19.6 % (ref 12.4–15.4)
DRUG SCREEN COMMENT UR-IMP: NORMAL
EOSINOPHIL # BLD: 0.1 K/UL (ref 0–0.6)
EOSINOPHIL NFR BLD: 1.8 %
FENTANYL SCREEN, URINE: NORMAL
HCT VFR BLD AUTO: 34.5 % (ref 36–48)
HGB BLD-MCNC: 11.4 G/DL (ref 12–16)
LYMPHOCYTES # BLD: 1.6 K/UL (ref 1–5.1)
LYMPHOCYTES NFR BLD: 23.2 %
MCH RBC QN AUTO: 29.3 PG (ref 26–34)
MCHC RBC AUTO-ENTMCNC: 33.1 G/DL (ref 31–36)
MCV RBC AUTO: 88.4 FL (ref 80–100)
METHADONE UR QL SCN>300 NG/ML: NORMAL
MONOCYTES # BLD: 0.5 K/UL (ref 0–1.3)
MONOCYTES NFR BLD: 7.4 %
NEUTROPHILS # BLD: 4.8 K/UL (ref 1.7–7.7)
NEUTROPHILS NFR BLD: 67.2 %
OPIATES UR QL SCN>300 NG/ML: NORMAL
OXYCODONE UR QL SCN: NORMAL
PCP UR QL SCN>25 NG/ML: NORMAL
PH UR STRIP: 6 [PH]
PLATELET # BLD AUTO: 222 K/UL (ref 135–450)
PMV BLD AUTO: 8.6 FL (ref 5–10.5)
RBC # BLD AUTO: 3.9 M/UL (ref 4–5.2)
REAGIN+T PALLIDUM IGG+IGM SERPL-IMP: NORMAL
WBC # BLD AUTO: 7.1 K/UL (ref 4–11)

## 2023-03-28 PROCEDURE — 3E0P7VZ INTRODUCTION OF HORMONE INTO FEMALE REPRODUCTIVE, VIA NATURAL OR ARTIFICIAL OPENING: ICD-10-PCS | Performed by: OBSTETRICS & GYNECOLOGY

## 2023-03-28 PROCEDURE — 86850 RBC ANTIBODY SCREEN: CPT

## 2023-03-28 PROCEDURE — 3700000025 EPIDURAL BLOCK: Performed by: ANESTHESIOLOGY

## 2023-03-28 PROCEDURE — 86780 TREPONEMA PALLIDUM: CPT

## 2023-03-28 PROCEDURE — 1220000000 HC SEMI PRIVATE OB R&B

## 2023-03-28 PROCEDURE — 6370000000 HC RX 637 (ALT 250 FOR IP)

## 2023-03-28 PROCEDURE — 6370000000 HC RX 637 (ALT 250 FOR IP): Performed by: OBSTETRICS & GYNECOLOGY

## 2023-03-28 PROCEDURE — 80307 DRUG TEST PRSMV CHEM ANLYZR: CPT

## 2023-03-28 PROCEDURE — 2500000003 HC RX 250 WO HCPCS: Performed by: NURSE ANESTHETIST, CERTIFIED REGISTERED

## 2023-03-28 PROCEDURE — 86901 BLOOD TYPING SEROLOGIC RH(D): CPT

## 2023-03-28 PROCEDURE — 2580000003 HC RX 258: Performed by: OBSTETRICS & GYNECOLOGY

## 2023-03-28 PROCEDURE — 86900 BLOOD TYPING SEROLOGIC ABO: CPT

## 2023-03-28 PROCEDURE — 85025 COMPLETE CBC W/AUTO DIFF WBC: CPT

## 2023-03-28 PROCEDURE — 3E033VJ INTRODUCTION OF OTHER HORMONE INTO PERIPHERAL VEIN, PERCUTANEOUS APPROACH: ICD-10-PCS | Performed by: OBSTETRICS & GYNECOLOGY

## 2023-03-28 RX ORDER — SODIUM CHLORIDE 9 MG/ML
25 INJECTION, SOLUTION INTRAVENOUS PRN
Status: DISCONTINUED | OUTPATIENT
Start: 2023-03-28 | End: 2023-03-29

## 2023-03-28 RX ORDER — SODIUM CHLORIDE, SODIUM LACTATE, POTASSIUM CHLORIDE, AND CALCIUM CHLORIDE .6; .31; .03; .02 G/100ML; G/100ML; G/100ML; G/100ML
1000 INJECTION, SOLUTION INTRAVENOUS PRN
Status: DISCONTINUED | OUTPATIENT
Start: 2023-03-28 | End: 2023-03-29

## 2023-03-28 RX ORDER — BUPIVACAINE HYDROCHLORIDE 2.5 MG/ML
INJECTION, SOLUTION EPIDURAL; INFILTRATION; INTRACAUDAL PRN
Status: DISCONTINUED | OUTPATIENT
Start: 2023-03-28 | End: 2023-03-29 | Stop reason: SDUPTHER

## 2023-03-28 RX ORDER — CARBOPROST TROMETHAMINE 250 UG/ML
250 INJECTION, SOLUTION INTRAMUSCULAR PRN
Status: DISCONTINUED | OUTPATIENT
Start: 2023-03-28 | End: 2023-03-29

## 2023-03-28 RX ORDER — SODIUM CHLORIDE, SODIUM LACTATE, POTASSIUM CHLORIDE, AND CALCIUM CHLORIDE .6; .31; .03; .02 G/100ML; G/100ML; G/100ML; G/100ML
500 INJECTION, SOLUTION INTRAVENOUS PRN
Status: DISCONTINUED | OUTPATIENT
Start: 2023-03-28 | End: 2023-03-29

## 2023-03-28 RX ORDER — MISOPROSTOL 100 UG/1
800 TABLET ORAL PRN
Status: DISCONTINUED | OUTPATIENT
Start: 2023-03-28 | End: 2023-03-29

## 2023-03-28 RX ORDER — ACETAMINOPHEN 325 MG/1
650 TABLET ORAL EVERY 4 HOURS PRN
Status: DISCONTINUED | OUTPATIENT
Start: 2023-03-28 | End: 2023-03-29

## 2023-03-28 RX ORDER — BUPIVACAINE HYDROCHLORIDE 2.5 MG/ML
INJECTION, SOLUTION EPIDURAL; INFILTRATION; INTRACAUDAL
Status: COMPLETED
Start: 2023-03-28 | End: 2023-03-28

## 2023-03-28 RX ORDER — GLYBURIDE 2.5 MG/1
2.5 TABLET ORAL
Status: ON HOLD | COMMUNITY
End: 2023-03-31 | Stop reason: HOSPADM

## 2023-03-28 RX ORDER — DIPHENHYDRAMINE HYDROCHLORIDE 50 MG/ML
25 INJECTION INTRAMUSCULAR; INTRAVENOUS EVERY 4 HOURS PRN
Status: DISCONTINUED | OUTPATIENT
Start: 2023-03-28 | End: 2023-03-29

## 2023-03-28 RX ORDER — SODIUM CHLORIDE 0.9 % (FLUSH) 0.9 %
5-40 SYRINGE (ML) INJECTION PRN
Status: DISCONTINUED | OUTPATIENT
Start: 2023-03-28 | End: 2023-03-29

## 2023-03-28 RX ORDER — SODIUM CHLORIDE 0.9 % (FLUSH) 0.9 %
5-40 SYRINGE (ML) INJECTION EVERY 12 HOURS SCHEDULED
Status: DISCONTINUED | OUTPATIENT
Start: 2023-03-28 | End: 2023-03-29

## 2023-03-28 RX ORDER — SODIUM CHLORIDE, SODIUM LACTATE, POTASSIUM CHLORIDE, CALCIUM CHLORIDE 600; 310; 30; 20 MG/100ML; MG/100ML; MG/100ML; MG/100ML
INJECTION, SOLUTION INTRAVENOUS CONTINUOUS
Status: DISCONTINUED | OUTPATIENT
Start: 2023-03-28 | End: 2023-03-29

## 2023-03-28 RX ORDER — ONDANSETRON 2 MG/ML
4 INJECTION INTRAMUSCULAR; INTRAVENOUS EVERY 6 HOURS PRN
Status: DISCONTINUED | OUTPATIENT
Start: 2023-03-28 | End: 2023-03-29

## 2023-03-28 RX ORDER — METHYLERGONOVINE MALEATE 0.2 MG/ML
200 INJECTION INTRAVENOUS PRN
Status: DISCONTINUED | OUTPATIENT
Start: 2023-03-28 | End: 2023-03-29

## 2023-03-28 RX ADMIN — Medication 25 MCG: at 08:07

## 2023-03-28 RX ADMIN — Medication 15 ML/HR: at 19:18

## 2023-03-28 RX ADMIN — SODIUM CHLORIDE, POTASSIUM CHLORIDE, SODIUM LACTATE AND CALCIUM CHLORIDE: 600; 310; 30; 20 INJECTION, SOLUTION INTRAVENOUS at 18:00

## 2023-03-28 RX ADMIN — Medication 25 MCG: at 12:10

## 2023-03-28 RX ADMIN — SODIUM CHLORIDE, POTASSIUM CHLORIDE, SODIUM LACTATE AND CALCIUM CHLORIDE: 600; 310; 30; 20 INJECTION, SOLUTION INTRAVENOUS at 16:02

## 2023-03-28 RX ADMIN — SODIUM CHLORIDE, POTASSIUM CHLORIDE, SODIUM LACTATE AND CALCIUM CHLORIDE: 600; 310; 30; 20 INJECTION, SOLUTION INTRAVENOUS at 21:07

## 2023-03-28 RX ADMIN — BUPIVACAINE HYDROCHLORIDE 1 ML: 2.5 INJECTION, SOLUTION EPIDURAL; INFILTRATION; INTRACAUDAL; PERINEURAL at 19:14

## 2023-03-28 NOTE — ANESTHESIA PRE PROCEDURE
HEPCAB    COVID-19 Screening (If Applicable): No results found for: COVID19        Anesthesia Evaluation  Patient summary reviewed and Nursing notes reviewed no history of anesthetic complications:   Airway: Mallampati: II  TM distance: >3 FB   Neck ROM: full  Mouth opening: > = 3 FB   Dental:          Pulmonary:Negative Pulmonary ROS                              Cardiovascular:Negative CV ROS                      Neuro/Psych:   (+) neuromuscular disease:,             GI/Hepatic/Renal: Neg GI/Hepatic/Renal ROS  (+) renal disease: kidney stones,           Endo/Other:    (+) Diabetes (GDM on Metformin), . Abdominal:             Vascular: negative vascular ROS. Other Findings:           Anesthesia Plan      epidural     ASA 2 - emergent             Anesthetic plan and risks discussed with patient. Plan discussed with attending. Alternatives to, benifits and risks of continuous lumbar epidural for labor (including, but not limited to, hypotension, spinal headache, inadequate sensory blockade) were discussed in detail with the patient. All questions were answered to her satisfaction.   The patient desires and agrees to proceed with continuous epidural.    ZOEY Ingram - CRNA   3/28/2023

## 2023-03-28 NOTE — H&P
Department of Obstetrics and Gynecology   Obstetrics History and Physical        CHIEF COMPLAINT:  induction    HISTORY OF PRESENT ILLNESS:      The patient is a 40 y.o. female at 39w0d. OB History          1    Para        Term                AB        Living             SAB        IAB        Ectopic        Molar        Multiple        Live Births                Patient presents with a chief complaint as above and is being admitted for induction because of gestational diabetes    Estimated Due Date: Estimated Date of Delivery: 23    PRENATAL CARE:    Complicated by: gestational diabetes class A 2    PAST OB HISTORY:  OB History          1    Para        Term                AB        Living             SAB        IAB        Ectopic        Molar        Multiple        Live Births                    Past Medical History:        Diagnosis Date    Fracture, shoulder     left shoulder nonop seeing PT 2019    Polycystic ovarian syndrome      Past Surgical History:        Procedure Laterality Date    CYST REMOVAL       Allergies:  Patient has no known allergies.     Social History:    Social History     Socioeconomic History    Marital status: Single     Spouse name: Not on file    Number of children: Not on file    Years of education: Not on file    Highest education level: Not on file   Occupational History    Not on file   Tobacco Use    Smoking status: Never    Smokeless tobacco: Never   Vaping Use    Vaping Use: Never used   Substance and Sexual Activity    Alcohol use: Yes     Comment: occ    Drug use: No    Sexual activity: Not on file   Other Topics Concern    Not on file   Social History Narrative    Not on file     Social Determinants of Health     Financial Resource Strain: Not on file   Food Insecurity: Not on file   Transportation Needs: Not on file   Physical Activity: Not on file   Stress: Not on file   Social Connections: Not on file   Intimate Partner Violence: Not on file Housing Stability: Not on file     Family History:       Problem Relation Age of Onset    High Blood Pressure Mother     High Blood Pressure Father     Diabetes Father      Medications Prior to Admission:  Medications Prior to Admission: metFORMIN (GLUCOPHAGE) 1000 MG tablet, Take 1,000 mg by mouth 2 times daily (with meals)  glyBURIDE (DIABETA) 2.5 MG tablet, Take 2.5 mg by mouth daily (with breakfast)  Prenatal Vit-Fe Fumarate-FA (PRENATAL 1+1 PO), Take 1 tablet by mouth daily  Pumpkin Seed-Soy Germ (AZO BLADDER CONTROL/GO-LESS PO), Take by mouth    REVIEW OF SYSTEMS:    wnl    PHYSICAL EXAM:  Vitals:    03/28/23 0659 03/28/23 0716   BP: 136/79    Pulse: 83    Resp: 16    Temp: 97.8 °F (36.6 °C)    TempSrc: Oral    SpO2: 99%    Weight:  210 lb (95.3 kg)   Height:  5' 6\" (1.676 m)     General appearance:  awake, alert, cooperative, no apparent distress, and appears stated age  Neurologic:  Awake, alert, oriented to name, place and time. Lungs:  No increased work of breathing, good air exchange  Abdomen:  Soft, non tender, gravid, consistent with her gestational age, EFW by Leopold's maneuver was 3800g   Fetal heart rate:  Reassuring.   Pelvis:  Adequate pelvis  Cervix: cl/thick/high  Contraction frequency:  irregualr  Membranes:  Intact    Labs: CBC:   Lab Results   Component Value Date/Time    WBC 10.8 09/17/2019 09:39 PM    RBC 4.66 09/17/2019 09:39 PM    HGB 13.1 09/17/2019 09:39 PM    HCT 40.1 09/17/2019 09:39 PM    MCV 86.0 09/17/2019 09:39 PM    MCH 28.1 09/17/2019 09:39 PM    MCHC 32.7 09/17/2019 09:39 PM    RDW 14.4 09/17/2019 09:39 PM     09/17/2019 09:39 PM    MPV 6.8 09/17/2019 09:39 PM       ASSESSMENT AND PLAN:    Labor: Admit, anticipate normal delivery, routine labor orders  Fetus: Reassuring  GBS: No  Other: cytotec followed by pitocin

## 2023-03-28 NOTE — ANESTHESIA PROCEDURE NOTES
Epidural Block    Patient location during procedure: OB  Start time: 3/28/2023 6:55 PM  End time: 3/28/2023 7:08 PM  Reason for block: labor epidural  Staffing  Performed: resident/CRNA   Anesthesiologist: Lashonda Peralta DO  Resident/CRNA: Love Guzmán APRN - CRNA  Epidural  Patient position: sitting  Prep: Betadine  Patient monitoring: cardiac monitor, continuous pulse ox and frequent blood pressure checks  Approach: midline  Location: L2-3  Injection technique: KATIA air and KATIA saline  Provider prep: mask and sterile gloves  Needle  Needle type: Tuohy   Needle gauge: 17 G  Needle length: 3.5 in  Catheter type: multi-orifice  Catheter size: 19 G (20 G)  Test dose: negative (3 cc of 1.5 % xylocaine with epi)Catheter Secured: tegaderm  Assessment  Sensory level: T8  Hemodynamics: stable  Attempts: 1  Outcomes: uncomplicated and patient tolerated procedure well  Additional Notes  Pt. prepped and draped in sterile fashion. Skin wheal with 1% lidocaine. 17ga touhy needle to KATIA. 25 ga. Spinal needle per touhy. CSF visualized in hub and 1 cc of 0.25 % bupivacaine injected. Needle withdrawn and catheter threaded. Negative test dose. Catheter secured with sterile dressing.    Preanesthetic Checklist  Completed: patient identified, IV checked, risks and benefits discussed, equipment checked, pre-op evaluation, timeout performed, anesthesia consent given, oxygen available and monitors applied/VS acknowledged

## 2023-03-29 PROBLEM — Z3A.39 39 WEEKS GESTATION OF PREGNANCY: Status: ACTIVE | Noted: 2023-03-29

## 2023-03-29 LAB
GLUCOSE BLD-MCNC: 74 MG/DL (ref 70–99)
PERFORMED ON: NORMAL

## 2023-03-29 PROCEDURE — 6360000002 HC RX W HCPCS: Performed by: OBSTETRICS & GYNECOLOGY

## 2023-03-29 PROCEDURE — 6360000002 HC RX W HCPCS: Performed by: NURSE ANESTHETIST, CERTIFIED REGISTERED

## 2023-03-29 PROCEDURE — 2500000003 HC RX 250 WO HCPCS: Performed by: NURSE ANESTHETIST, CERTIFIED REGISTERED

## 2023-03-29 PROCEDURE — 7100000001 HC PACU RECOVERY - ADDTL 15 MIN: Performed by: OBSTETRICS & GYNECOLOGY

## 2023-03-29 PROCEDURE — 3609079900 HC CESAREAN SECTION: Performed by: OBSTETRICS & GYNECOLOGY

## 2023-03-29 PROCEDURE — 3700000001 HC ADD 15 MINUTES (ANESTHESIA): Performed by: OBSTETRICS & GYNECOLOGY

## 2023-03-29 PROCEDURE — 2709999900 HC NON-CHARGEABLE SUPPLY: Performed by: OBSTETRICS & GYNECOLOGY

## 2023-03-29 PROCEDURE — 2580000003 HC RX 258: Performed by: OBSTETRICS & GYNECOLOGY

## 2023-03-29 PROCEDURE — 7100000000 HC PACU RECOVERY - FIRST 15 MIN: Performed by: OBSTETRICS & GYNECOLOGY

## 2023-03-29 PROCEDURE — 1220000000 HC SEMI PRIVATE OB R&B

## 2023-03-29 PROCEDURE — 6360000002 HC RX W HCPCS

## 2023-03-29 PROCEDURE — 3700000000 HC ANESTHESIA ATTENDED CARE: Performed by: OBSTETRICS & GYNECOLOGY

## 2023-03-29 RX ORDER — DEXMEDETOMIDINE HYDROCHLORIDE 100 UG/ML
INJECTION, SOLUTION INTRAVENOUS PRN
Status: DISCONTINUED | OUTPATIENT
Start: 2023-03-29 | End: 2023-03-29 | Stop reason: SDUPTHER

## 2023-03-29 RX ORDER — MORPHINE SULFATE 0.5 MG/ML
INJECTION, SOLUTION EPIDURAL; INTRATHECAL; INTRAVENOUS PRN
Status: DISCONTINUED | OUTPATIENT
Start: 2023-03-29 | End: 2023-03-29 | Stop reason: SDUPTHER

## 2023-03-29 RX ORDER — ONDANSETRON 2 MG/ML
4 INJECTION INTRAMUSCULAR; INTRAVENOUS EVERY 6 HOURS PRN
Status: DISCONTINUED | OUTPATIENT
Start: 2023-03-29 | End: 2023-03-31 | Stop reason: HOSPADM

## 2023-03-29 RX ORDER — ACETAMINOPHEN 500 MG
1000 TABLET ORAL EVERY 8 HOURS SCHEDULED
Status: DISCONTINUED | OUTPATIENT
Start: 2023-03-29 | End: 2023-03-31 | Stop reason: HOSPADM

## 2023-03-29 RX ORDER — SODIUM CHLORIDE, SODIUM LACTATE, POTASSIUM CHLORIDE, AND CALCIUM CHLORIDE .6; .31; .03; .02 G/100ML; G/100ML; G/100ML; G/100ML
1000 INJECTION, SOLUTION INTRAVENOUS ONCE
Status: DISCONTINUED | OUTPATIENT
Start: 2023-03-29 | End: 2023-03-29

## 2023-03-29 RX ORDER — SODIUM CHLORIDE, SODIUM LACTATE, POTASSIUM CHLORIDE, CALCIUM CHLORIDE 600; 310; 30; 20 MG/100ML; MG/100ML; MG/100ML; MG/100ML
INJECTION, SOLUTION INTRAVENOUS CONTINUOUS
Status: DISCONTINUED | OUTPATIENT
Start: 2023-03-29 | End: 2023-03-29

## 2023-03-29 RX ORDER — OXYCODONE HYDROCHLORIDE 5 MG/1
5 TABLET ORAL EVERY 4 HOURS PRN
Status: DISCONTINUED | OUTPATIENT
Start: 2023-03-29 | End: 2023-03-31 | Stop reason: HOSPADM

## 2023-03-29 RX ORDER — FAMOTIDINE 10 MG/ML
INJECTION, SOLUTION INTRAVENOUS
Status: COMPLETED
Start: 2023-03-29 | End: 2023-03-29

## 2023-03-29 RX ORDER — CEFAZOLIN SODIUM IN 0.9 % NACL 2 G/100 ML
2000 PLASTIC BAG, INJECTION (ML) INTRAVENOUS ONCE
Status: COMPLETED | OUTPATIENT
Start: 2023-03-29 | End: 2023-03-29

## 2023-03-29 RX ORDER — LIDOCAINE HYDROCHLORIDE 20 MG/ML
INJECTION, SOLUTION EPIDURAL; INFILTRATION; INTRACAUDAL; PERINEURAL PRN
Status: DISCONTINUED | OUTPATIENT
Start: 2023-03-29 | End: 2023-03-29 | Stop reason: SDUPTHER

## 2023-03-29 RX ORDER — OXYCODONE HYDROCHLORIDE 5 MG/1
10 TABLET ORAL EVERY 4 HOURS PRN
Status: DISCONTINUED | OUTPATIENT
Start: 2023-03-29 | End: 2023-03-31 | Stop reason: HOSPADM

## 2023-03-29 RX ORDER — SODIUM CHLORIDE 0.9 % (FLUSH) 0.9 %
10 SYRINGE (ML) INJECTION PRN
Status: DISCONTINUED | OUTPATIENT
Start: 2023-03-29 | End: 2023-03-29

## 2023-03-29 RX ORDER — ONDANSETRON 2 MG/ML
INJECTION INTRAMUSCULAR; INTRAVENOUS PRN
Status: DISCONTINUED | OUTPATIENT
Start: 2023-03-29 | End: 2023-03-29 | Stop reason: SDUPTHER

## 2023-03-29 RX ORDER — DIPHENHYDRAMINE HYDROCHLORIDE 50 MG/ML
12.5 INJECTION INTRAMUSCULAR; INTRAVENOUS EVERY 6 HOURS PRN
Status: DISCONTINUED | OUTPATIENT
Start: 2023-03-29 | End: 2023-03-31 | Stop reason: HOSPADM

## 2023-03-29 RX ORDER — DOCUSATE SODIUM 100 MG/1
100 CAPSULE, LIQUID FILLED ORAL 2 TIMES DAILY
Status: DISCONTINUED | OUTPATIENT
Start: 2023-03-29 | End: 2023-03-31 | Stop reason: HOSPADM

## 2023-03-29 RX ORDER — KETOROLAC TROMETHAMINE 30 MG/ML
30 INJECTION, SOLUTION INTRAMUSCULAR; INTRAVENOUS EVERY 6 HOURS
Status: DISPENSED | OUTPATIENT
Start: 2023-03-29 | End: 2023-03-30

## 2023-03-29 RX ORDER — TRISODIUM CITRATE DIHYDRATE AND CITRIC ACID MONOHYDRATE 500; 334 MG/5ML; MG/5ML
30 SOLUTION ORAL ONCE
Status: DISCONTINUED | OUTPATIENT
Start: 2023-03-29 | End: 2023-03-29

## 2023-03-29 RX ORDER — SODIUM CHLORIDE, SODIUM LACTATE, POTASSIUM CHLORIDE, CALCIUM CHLORIDE 600; 310; 30; 20 MG/100ML; MG/100ML; MG/100ML; MG/100ML
INJECTION, SOLUTION INTRAVENOUS CONTINUOUS
Status: DISCONTINUED | OUTPATIENT
Start: 2023-03-29 | End: 2023-03-31 | Stop reason: HOSPADM

## 2023-03-29 RX ORDER — OXYTOCIN 10 [USP'U]/ML
INJECTION, SOLUTION INTRAMUSCULAR; INTRAVENOUS PRN
Status: DISCONTINUED | OUTPATIENT
Start: 2023-03-29 | End: 2023-03-29 | Stop reason: SDUPTHER

## 2023-03-29 RX ORDER — FERROUS SULFATE 325(65) MG
325 TABLET ORAL 2 TIMES DAILY WITH MEALS
Status: DISCONTINUED | OUTPATIENT
Start: 2023-03-29 | End: 2023-03-31 | Stop reason: HOSPADM

## 2023-03-29 RX ORDER — SODIUM CHLORIDE 9 MG/ML
INJECTION, SOLUTION INTRAVENOUS PRN
Status: DISCONTINUED | OUTPATIENT
Start: 2023-03-29 | End: 2023-03-29

## 2023-03-29 RX ORDER — PROCHLORPERAZINE EDISYLATE 5 MG/ML
INJECTION INTRAMUSCULAR; INTRAVENOUS PRN
Status: DISCONTINUED | OUTPATIENT
Start: 2023-03-29 | End: 2023-03-29 | Stop reason: SDUPTHER

## 2023-03-29 RX ORDER — METHYLERGONOVINE MALEATE 0.2 MG/ML
200 INJECTION INTRAVENOUS PRN
Status: DISCONTINUED | OUTPATIENT
Start: 2023-03-29 | End: 2023-03-31 | Stop reason: HOSPADM

## 2023-03-29 RX ORDER — LANOLIN 100 %
OINTMENT (GRAM) TOPICAL
Status: DISCONTINUED | OUTPATIENT
Start: 2023-03-29 | End: 2023-03-31 | Stop reason: HOSPADM

## 2023-03-29 RX ORDER — SODIUM CHLORIDE 0.9 % (FLUSH) 0.9 %
5-40 SYRINGE (ML) INJECTION PRN
Status: DISCONTINUED | OUTPATIENT
Start: 2023-03-29 | End: 2023-03-31 | Stop reason: HOSPADM

## 2023-03-29 RX ORDER — FAMOTIDINE 10 MG/ML
INJECTION, SOLUTION INTRAVENOUS PRN
Status: DISCONTINUED | OUTPATIENT
Start: 2023-03-29 | End: 2023-03-29 | Stop reason: SDUPTHER

## 2023-03-29 RX ORDER — IBUPROFEN 600 MG/1
600 TABLET ORAL EVERY 8 HOURS SCHEDULED
Status: DISCONTINUED | OUTPATIENT
Start: 2023-03-30 | End: 2023-03-31 | Stop reason: HOSPADM

## 2023-03-29 RX ORDER — SODIUM CHLORIDE 9 MG/ML
INJECTION, SOLUTION INTRAVENOUS PRN
Status: DISCONTINUED | OUTPATIENT
Start: 2023-03-29 | End: 2023-03-31 | Stop reason: HOSPADM

## 2023-03-29 RX ORDER — AZITHROMYCIN 500 MG/1
INJECTION, POWDER, LYOPHILIZED, FOR SOLUTION INTRAVENOUS
Status: DISCONTINUED
Start: 2023-03-29 | End: 2023-03-29

## 2023-03-29 RX ORDER — PHENYLEPHRINE HYDROCHLORIDE 10 MG/ML
INJECTION INTRAVENOUS PRN
Status: DISCONTINUED | OUTPATIENT
Start: 2023-03-29 | End: 2023-03-29 | Stop reason: SDUPTHER

## 2023-03-29 RX ORDER — SODIUM CHLORIDE 0.9 % (FLUSH) 0.9 %
10 SYRINGE (ML) INJECTION EVERY 12 HOURS SCHEDULED
Status: DISCONTINUED | OUTPATIENT
Start: 2023-03-29 | End: 2023-03-29

## 2023-03-29 RX ORDER — SODIUM CHLORIDE 0.9 % (FLUSH) 0.9 %
5-40 SYRINGE (ML) INJECTION EVERY 12 HOURS SCHEDULED
Status: DISCONTINUED | OUTPATIENT
Start: 2023-03-29 | End: 2023-03-31 | Stop reason: HOSPADM

## 2023-03-29 RX ADMIN — MORPHINE SULFATE 2.5 MG: 0.5 INJECTION EPIDURAL; INTRATHECAL; INTRAVENOUS at 13:20

## 2023-03-29 RX ADMIN — ONDANSETRON 4 MG: 2 INJECTION INTRAMUSCULAR; INTRAVENOUS at 04:54

## 2023-03-29 RX ADMIN — ONDANSETRON 4 MG: 2 INJECTION INTRAMUSCULAR; INTRAVENOUS at 13:35

## 2023-03-29 RX ADMIN — LIDOCAINE HYDROCHLORIDE 5 ML: 20 INJECTION, SOLUTION EPIDURAL; INFILTRATION; INTRACAUDAL; PERINEURAL at 12:47

## 2023-03-29 RX ADMIN — Medication 2000 MG: at 12:36

## 2023-03-29 RX ADMIN — DEXMEDETOMIDINE HCL 20 MCG: 100 INJECTION INTRAVENOUS at 12:33

## 2023-03-29 RX ADMIN — AZITHROMYCIN MONOHYDRATE 500 MG: 500 INJECTION, POWDER, LYOPHILIZED, FOR SOLUTION INTRAVENOUS at 12:36

## 2023-03-29 RX ADMIN — KETOROLAC TROMETHAMINE 30 MG: 30 INJECTION, SOLUTION INTRAMUSCULAR; INTRAVENOUS at 16:04

## 2023-03-29 RX ADMIN — SODIUM CHLORIDE, POTASSIUM CHLORIDE, SODIUM LACTATE AND CALCIUM CHLORIDE: 600; 310; 30; 20 INJECTION, SOLUTION INTRAVENOUS at 05:01

## 2023-03-29 RX ADMIN — Medication 1 MILLI-UNITS/MIN: at 01:01

## 2023-03-29 RX ADMIN — FAMOTIDINE 20 MG: 10 INJECTION, SOLUTION INTRAVENOUS at 12:33

## 2023-03-29 RX ADMIN — OXYTOCIN 20 UNITS: 10 INJECTION INTRAVENOUS at 13:08

## 2023-03-29 RX ADMIN — Medication 15 ML/HR: at 10:45

## 2023-03-29 RX ADMIN — SODIUM CHLORIDE, POTASSIUM CHLORIDE, SODIUM LACTATE AND CALCIUM CHLORIDE: 600; 310; 30; 20 INJECTION, SOLUTION INTRAVENOUS at 13:51

## 2023-03-29 RX ADMIN — SODIUM CHLORIDE, POTASSIUM CHLORIDE, SODIUM LACTATE AND CALCIUM CHLORIDE: 600; 310; 30; 20 INJECTION, SOLUTION INTRAVENOUS at 12:50

## 2023-03-29 RX ADMIN — PROCHLORPERAZINE EDISYLATE 5 MG: 5 INJECTION INTRAMUSCULAR; INTRAVENOUS at 13:35

## 2023-03-29 RX ADMIN — PHENYLEPHRINE HYDROCHLORIDE 50 MCG: 10 INJECTION INTRAVENOUS at 12:48

## 2023-03-29 RX ADMIN — KETOROLAC TROMETHAMINE 30 MG: 30 INJECTION, SOLUTION INTRAMUSCULAR; INTRAVENOUS at 22:12

## 2023-03-29 RX ADMIN — ONDANSETRON 4 MG: 2 INJECTION INTRAMUSCULAR; INTRAVENOUS at 12:33

## 2023-03-29 RX ADMIN — LIDOCAINE HYDROCHLORIDE 10 ML: 20 INJECTION, SOLUTION EPIDURAL; INFILTRATION; INTRACAUDAL; PERINEURAL at 12:33

## 2023-03-29 ASSESSMENT — PAIN - FUNCTIONAL ASSESSMENT: PAIN_FUNCTIONAL_ASSESSMENT: ACTIVITIES ARE NOT PREVENTED

## 2023-03-29 ASSESSMENT — PAIN DESCRIPTION - DESCRIPTORS: DESCRIPTORS: ACHING;DISCOMFORT;SORE

## 2023-03-29 ASSESSMENT — PAIN DESCRIPTION - LOCATION: LOCATION: ABDOMEN;INCISION

## 2023-03-29 ASSESSMENT — PAIN SCALES - GENERAL
PAINLEVEL_OUTOF10: 3
PAINLEVEL_OUTOF10: 6

## 2023-03-29 ASSESSMENT — PAIN DESCRIPTION - ORIENTATION: ORIENTATION: LOWER

## 2023-03-29 NOTE — PLAN OF CARE
Problem: Postpartum  Goal: Experiences normal postpartum course  Description:  Postpartum OB-Pregnancy care plan goal which identifies if the mother is experiencing a normal postpartum course  Outcome: Progressing  Goal: Appropriate maternal -  bonding  Description:  Postpartum OB-Pregnancy care plan goal which identifies if the mother and  are bonding appropriately  Outcome: Progressing  Goal: Establishment of infant feeding pattern  Description:  Postpartum OB-Pregnancy care plan goal which identifies if the mother is establishing a feeding pattern with their   Outcome: Progressing  Goal: Incisions, wounds, or drain sites healing without S/S of infection  Outcome: Progressing     Problem: Infection - Adult  Goal: Absence of infection at discharge  Outcome: Progressing  Goal: Absence of infection during hospitalization  Outcome: Progressing  Goal: Absence of fever/infection during anticipated neutropenic period  Outcome: Progressing     Problem: Discharge Planning  Goal: Discharge to home or other facility with appropriate resources  Outcome: Progressing     Problem: Chronic Conditions and Co-morbidities  Goal: Patient's chronic conditions and co-morbidity symptoms are monitored and maintained or improved  Outcome: Progressing     Problem: Pain  Goal: Verbalizes/displays adequate comfort level or baseline comfort level  Outcome: Progressing     Problem: Safety - Adult  Goal: Free from fall injury  Outcome: Progressing

## 2023-03-29 NOTE — BRIEF OP NOTE
Brief Postoperative Note      Patient: Leeann Gamboa  YOB: 1985  MRN: [id number]    Date of Procedure: 3/29/2023    Pre-Op Diagnosis: Failure to descend    Post-Op Diagnosis: Same       Procedure(s):   SECTION-Primary LTC/S    Surgeon(s):  Marvin Boxer, MD    Assistant:  Surgical Assistant: Elvira Santo    Anesthesia: epidural    Estimated Blood Loss (mL): 213 SBL    Complications: None    Specimens:   * No specimens in log *    Implants:  * No implants in log *      Drains:   Urinary Catheter 23 Mercedes (Active)   Site Assessment Pink;Moist 23 010   Urine Color Yellow 23   Urine Appearance Cloudy 23 010   Output (mL) 300 mL 23 010       Findings: VFI, 3815 gm. Apgars 9/9.  \"Jimbo\"    Electronically signed by Toya Love MD on 3/29/2023 at 1:53 PM

## 2023-03-29 NOTE — ANESTHESIA POSTPROCEDURE EVALUATION
Department of Anesthesiology  Postprocedure Note    Patient: Suzanne Cardozo  MRN: [id number]  YOB: 1985  Date of evaluation: 3/29/2023      Procedure Summary     Date: 23 Room / Location: [facility]    Anesthesia Start: 1169 Anesthesia Stop: 23 1358    Procedures:        SECTION (Abdomen)      Labor Analgesia Diagnosis:       Failure to progress in labor      (Failure to progress in labor [O62.2])    Surgeons: Umberto Farrar MD Responsible Provider: Graciela Camacho MD    Anesthesia Type: epidural ASA Status: 2 - Emergent          Anesthesia Type: No value filed.     Nyasia Phase I: Nyasia Score: 9    Nyasia Phase II: Nyasia Score: 10      Anesthesia Post Evaluation    Patient location during evaluation: PACU  Patient participation: complete - patient participated  Level of consciousness: awake and alert  Pain score: 0  Airway patency: patent  Nausea & Vomiting: no nausea and no vomiting  Complications: no  Cardiovascular status: hemodynamically stable  Respiratory status: acceptable  Hydration status: stable

## 2023-03-30 LAB
DEPRECATED RDW RBC AUTO: 19.6 % (ref 12.4–15.4)
GLUCOSE BLD-MCNC: 114 MG/DL (ref 70–99)
HCT VFR BLD AUTO: 30.3 % (ref 36–48)
HGB BLD-MCNC: 10 G/DL (ref 12–16)
MCH RBC QN AUTO: 29.7 PG (ref 26–34)
MCHC RBC AUTO-ENTMCNC: 33.1 G/DL (ref 31–36)
MCV RBC AUTO: 89.7 FL (ref 80–100)
PERFORMED ON: ABNORMAL
PLATELET # BLD AUTO: 210 K/UL (ref 135–450)
PMV BLD AUTO: 8.2 FL (ref 5–10.5)
RBC # BLD AUTO: 3.37 M/UL (ref 4–5.2)
WBC # BLD AUTO: 10.9 K/UL (ref 4–11)

## 2023-03-30 PROCEDURE — 6370000000 HC RX 637 (ALT 250 FOR IP): Performed by: OBSTETRICS & GYNECOLOGY

## 2023-03-30 PROCEDURE — 36415 COLL VENOUS BLD VENIPUNCTURE: CPT

## 2023-03-30 PROCEDURE — 1220000000 HC SEMI PRIVATE OB R&B

## 2023-03-30 PROCEDURE — 85027 COMPLETE CBC AUTOMATED: CPT

## 2023-03-30 PROCEDURE — 6370000000 HC RX 637 (ALT 250 FOR IP)

## 2023-03-30 PROCEDURE — 2580000003 HC RX 258: Performed by: OBSTETRICS & GYNECOLOGY

## 2023-03-30 PROCEDURE — 6360000002 HC RX W HCPCS: Performed by: OBSTETRICS & GYNECOLOGY

## 2023-03-30 RX ORDER — IBUPROFEN 800 MG/1
TABLET ORAL
Status: DISCONTINUED
Start: 2023-03-30 | End: 2023-03-30 | Stop reason: WASHOUT

## 2023-03-30 RX ORDER — IBUPROFEN 600 MG/1
TABLET ORAL
Status: COMPLETED
Start: 2023-03-30 | End: 2023-03-30

## 2023-03-30 RX ADMIN — IBUPROFEN 600 MG: 600 TABLET, FILM COATED ORAL at 12:35

## 2023-03-30 RX ADMIN — ACETAMINOPHEN 1000 MG: 500 TABLET ORAL at 08:22

## 2023-03-30 RX ADMIN — IBUPROFEN 600 MG: 600 TABLET, FILM COATED ORAL at 20:58

## 2023-03-30 RX ADMIN — DOCUSATE SODIUM 100 MG: 100 CAPSULE, LIQUID FILLED ORAL at 20:58

## 2023-03-30 RX ADMIN — DOCUSATE SODIUM 100 MG: 100 CAPSULE, LIQUID FILLED ORAL at 12:38

## 2023-03-30 RX ADMIN — ACETAMINOPHEN 1000 MG: 500 TABLET ORAL at 16:40

## 2023-03-30 RX ADMIN — Medication 10 ML: at 08:23

## 2023-03-30 RX ADMIN — KETOROLAC TROMETHAMINE 30 MG: 30 INJECTION, SOLUTION INTRAMUSCULAR; INTRAVENOUS at 04:17

## 2023-03-30 ASSESSMENT — PAIN SCALES - GENERAL
PAINLEVEL_OUTOF10: 2
PAINLEVEL_OUTOF10: 4
PAINLEVEL_OUTOF10: 5

## 2023-03-30 ASSESSMENT — PAIN - FUNCTIONAL ASSESSMENT
PAIN_FUNCTIONAL_ASSESSMENT: ACTIVITIES ARE NOT PREVENTED

## 2023-03-30 ASSESSMENT — PAIN DESCRIPTION - ORIENTATION
ORIENTATION: LOWER;MID
ORIENTATION: LOWER
ORIENTATION: MID;LOWER

## 2023-03-30 ASSESSMENT — PAIN DESCRIPTION - DESCRIPTORS
DESCRIPTORS: ACHING;SORE
DESCRIPTORS: DISCOMFORT

## 2023-03-30 ASSESSMENT — PAIN DESCRIPTION - LOCATION
LOCATION: ABDOMEN;INCISION
LOCATION: INCISION

## 2023-03-30 NOTE — L&D DELIVERY SUMMARY NOTE
[address] [address]                            LABOR AND DELIVERY NOTE    PATIENT NAME: Carolina Buitrago                    :        1985  MED REC NO:   [id number]                          ROOM:       [id number]  ACCOUNT NO:   [de-identified]                           ADMIT DATE: 2023  PROVIDER:     Reji Sloan MD    DATE OF PROCEDURE:  2023    PREOPERATIVE DIAGNOSES:  Failure to descend, gestational diabetes A2. POSTOPERATIVE DIAGNOSES:  Failure to descend, gestational diabetes A2. PROCEDURE:  Primary low-transverse  section. SURGEON:  Reji Sloan MD    ANESTHESIA:  Epidural.    SURGICAL BLOOD LOSS:  799 mL. URINE OUTPUT:  200 mL clear yellow. FINDINGS:  Female infant, 3815 gm, Apgars 9 at one minute and 9 at 5  minutes. INDICATIONS AND CONSENT:  71-year-old  1, para 0 who was  admitted at 44 weeks for induction of labor with gestational diabetes  controlled with metformin and glyburide. Fetal weight was estimated at  approximately 7.5 pounds. The patient was admitted and received Cytotec  followed by Pitocin. The patient did request and received an epidural.   Beta strep culture was negative. The patient progressed to 9 cm, but failed to progress beyond 9 cm at 0  station over 6 hours of adequate labor. Diagnosis of failure to descend  was made. Delivery was reviewed with the patient.  section was  recommended. Side effects, benefits and risk outlined. Consent was  obtained. The patient did receive preoperative antibiotics, did have  compression boots placed preoperatively. Epidural was dosed for   delivery. The patient was taken to the operating room suite. DESCRIPTION OF PROCEDURE:  The patient was placed on the OR table in  supine position and a 15-degree left lateral tilt. The patient was  prepped and draped in sterile fashion. Mercedes catheter had been placed. The abdominal cavity was entered through a Pfannenstiel incision in  interrupted layers. Vesicouterine peritoneum was taken off the lower  uterine segment. Uterine cavity was entered sharply in a low-transverse  manner and extended laterally in blunt fashion. Amniotic fluid was  noted be clear with amniotomy. The vertex fetus was delivered through  the incision without difficulty. Cord was delayed clamped. Baby was  vigorous at birth. Placenta spontaneously  from uterine cavity grossly intact with  a three-vessel cord. Uterus was exteriorized onto the anterior  abdominal wall. Bilateral tubes and ovaries appeared normal.  Uterine  cavity was explored and free of any remaining placental tissue or  amniotic membranes. Uterine incision was closed with 0 Monocryl in  continuous locking fashion. Double layer closure was carried out. Uterus was placed back into the abdominal cavity. Lateral gutters were  cleansed of any remaining blood, amniotic fluid and blood clots. Uterine incision was explored and hemostatic. Preliminary sponge and  needle count were reported as correct. Peritoneum was closed with 3-0 Vicryl. Fascia was closed with 0 Maxon. Subcutaneous tissue was closed with 3-0 Vicryl. Skin was closed with  4-0 Vicryl in subcuticular fashion. Steri-Strips were placed. Final  sponge and needle count were reported as correct. The patient was moved  to recovery room in good condition. The urine output ____200_ mL clear. Surgical blood loss 799. Baby was 3815 gm. Apgars 9 at one minute and  9 at 5 minutes. The patient her little girl _____.         Angy Elaine MD    D: 03/29/2023 13:59:43       T: 03/29/2023 14:03:09     /[email or web address]  Job#: [id number]     Doc#: [id number]    CC:

## 2023-03-30 NOTE — PLAN OF CARE
Problem: Postpartum  Goal: Experiences normal postpartum course  Description:  Postpartum OB-Pregnancy care plan goal which identifies if the mother is experiencing a normal postpartum course  3/30/2023 07 by Jimi Dubose RN  Outcome: Progressing  3/29/2023 1929 by Khari Marte RN  Outcome: Progressing  Goal: Appropriate maternal -  bonding  Description:  Postpartum OB-Pregnancy care plan goal which identifies if the mother and  are bonding appropriately  3/30/2023 07 by Jimi Dubose RN  Outcome: Progressing  3/29/2023 1929 by Khari Marte RN  Outcome: Progressing  Goal: Establishment of infant feeding pattern  Description:  Postpartum OB-Pregnancy care plan goal which identifies if the mother is establishing a feeding pattern with their   3/30/2023 07 by Jimi Dubose RN  Outcome: Progressing  3/29/2023 1929 by Khari Marte RN  Outcome: Progressing  Goal: Incisions, wounds, or drain sites healing without S/S of infection  3/30/2023 0746 by Jimi Dubose RN  Outcome: Progressing  3/29/2023 1929 by Khari Marte RN  Outcome: Progressing     Problem: Infection - Adult  Goal: Absence of infection at discharge  3/30/2023 07 by Jimi Dubose RN  Outcome: Progressing  3/29/2023 1929 by Khari Marte RN  Outcome: Progressing  Goal: Absence of infection during hospitalization  3/30/2023 07 by Jimi Dubose RN  Outcome: Progressing  3/29/2023 1929 by Khari Marte RN  Outcome: Progressing  Goal: Absence of fever/infection during anticipated neutropenic period  3/30/2023 07 by Jimi Dubose RN  Outcome: Progressing  3/29/2023 1929 by Khari Marte RN  Outcome: Progressing     Problem: Discharge Planning  Goal: Discharge to home or other facility with appropriate resources  3/30/2023 0746 by Jimi Dubose RN  Outcome: Progressing  3/29/2023 1929 by Khari Marte RN  Outcome: Progressing     Problem: Chronic Conditions and Co-morbidities  Goal: Patient's chronic conditions and

## 2023-03-31 VITALS
OXYGEN SATURATION: 100 % | RESPIRATION RATE: 14 BRPM | SYSTOLIC BLOOD PRESSURE: 141 MMHG | HEIGHT: 66 IN | TEMPERATURE: 97.9 F | WEIGHT: 210 LBS | HEART RATE: 72 BPM | DIASTOLIC BLOOD PRESSURE: 78 MMHG | BODY MASS INDEX: 33.75 KG/M2

## 2023-03-31 PROCEDURE — 6370000000 HC RX 637 (ALT 250 FOR IP): Performed by: OBSTETRICS & GYNECOLOGY

## 2023-03-31 RX ORDER — PSEUDOEPHEDRINE HCL 30 MG
100 TABLET ORAL 2 TIMES DAILY
Qty: 60 CAPSULE | Refills: 0 | Status: SHIPPED | OUTPATIENT
Start: 2023-03-31 | End: 2023-04-06

## 2023-03-31 RX ORDER — OXYCODONE HYDROCHLORIDE 5 MG/1
5 TABLET ORAL EVERY 4 HOURS PRN
Qty: 20 TABLET | Refills: 0 | Status: SHIPPED | OUTPATIENT
Start: 2023-03-31 | End: 2023-04-03

## 2023-03-31 RX ORDER — IBUPROFEN 800 MG/1
800 TABLET ORAL EVERY 8 HOURS PRN
Qty: 30 TABLET | Refills: 0 | Status: SHIPPED | OUTPATIENT
Start: 2023-03-31

## 2023-03-31 RX ADMIN — IBUPROFEN 600 MG: 600 TABLET, FILM COATED ORAL at 05:03

## 2023-03-31 RX ADMIN — IBUPROFEN 600 MG: 600 TABLET, FILM COATED ORAL at 12:56

## 2023-03-31 RX ADMIN — OXYCODONE HYDROCHLORIDE 5 MG: 5 TABLET ORAL at 00:09

## 2023-03-31 RX ADMIN — ACETAMINOPHEN 1000 MG: 500 TABLET ORAL at 08:56

## 2023-03-31 RX ADMIN — ACETAMINOPHEN 1000 MG: 500 TABLET ORAL at 00:09

## 2023-03-31 RX ADMIN — DOCUSATE SODIUM 100 MG: 100 CAPSULE, LIQUID FILLED ORAL at 08:56

## 2023-03-31 ASSESSMENT — PAIN - FUNCTIONAL ASSESSMENT
PAIN_FUNCTIONAL_ASSESSMENT: ACTIVITIES ARE NOT PREVENTED
PAIN_FUNCTIONAL_ASSESSMENT: ACTIVITIES ARE NOT PREVENTED

## 2023-03-31 ASSESSMENT — PAIN DESCRIPTION - ORIENTATION: ORIENTATION: LOWER

## 2023-03-31 ASSESSMENT — PAIN SCALES - GENERAL
PAINLEVEL_OUTOF10: 4
PAINLEVEL_OUTOF10: 7
PAINLEVEL_OUTOF10: 3
PAINLEVEL_OUTOF10: 3

## 2023-03-31 ASSESSMENT — PAIN DESCRIPTION - LOCATION
LOCATION: ABDOMEN
LOCATION: ABDOMEN;INCISION

## 2023-03-31 ASSESSMENT — PAIN DESCRIPTION - DESCRIPTORS: DESCRIPTORS: CRAMPING

## 2023-03-31 NOTE — PROGRESS NOTES
CORAL Baker at bedside at this time for epidural placement. Epidural in at 1908  Test dose at 1909. Patient tolerated well.
Call from pt saying that her water broke. Went to pt room to find a lg amount of clear fluid on the bed. I checked pt. SVE 1-2/70/-3. Complete linen change and bed pad changed. Pt boosted up in bed and positioned on her right side.  Pt tolerated well
Cx 9/0/caput and molding  FHT Cat 1  Ctxs q2-4 min  Reviewed Pitocin to increase ctxs/strength vs C/S for FTD-s/b/r. Pt and  wish to proceed with C/D  Consented to proceed.     Cruz Hernandes
Delivery of viable infant female via  section at this time.
Department of Obstetrics and Gynecology  Labor and Delivery   Attending Progress Note      SUBJECTIVE:  pt comfortable with epidural    OBJECTIVE:      Fetal heart rate:       Baseline Heart Rate:  160        Accelerations:  present       Long Term Variability:  moderate       Decelerations:  previous decelerations resolved         Contraction frequency: 3 minutes    Membranes:  Ruptured clear fluid    Cervix:         Dilation:  9 cm         Effacement:  100%         Station:  0         Position:  anterior             ASSESSMENT & PLAN:    Continue induction of labor
Department of Obstetrics and Gynecology  Labor and Delivery   Attending Progress Note      SUBJECTIVE:  pt with increasing contractions    OBJECTIVE:      Fetal heart rate:       Baseline Heart Rate:  140        Accelerations:  present       Long Term Variability:  moderate       Decelerations:  absent         Contraction frequency: 3 minutes    Membranes:  Intact    Cervix:         Dilation:  1 cm         Effacement:  75%         Station:  -2         Position:  anterior             ASSESSMENT & PLAN:    Will get epidural   Consider starting pitocin vs another cytotec
Department of Obstetrics and Gynecology  Labor and Delivery  Attending Post Partum Progress Note      SUBJECTIVE:  Pt without complaints, pain controlled, tolerating po, lochia wnl, the patient is currently breastfeeding. negative flatus. OBJECTIVE:      Vitals:  Vitals:    23 0826   BP: 124/77   Pulse: 81   Resp: 16   Temp: 98.2 °F (36.8 °C)   SpO2:        RRR CTAB  ABDOMEN:  soft, non-distended, non-tender, + BS  FF below umbilicus  Incision: c/d/i with steri-strips  EXT: no edema    DATA:    CBC:    Lab Results   Component Value Date/Time    WBC 10.9 2023 09:37 AM    HGB 10.0 2023 09:37 AM    HCT 30.3 2023 09:37 AM     2023 09:37 AM       ASSESSMENT & PLAN:    40 y.o.   OB History          1    Para   1    Term   1            AB        Living   1         SAB        IAB        Ectopic        Molar        Multiple   0    Live Births   1             s/p C/S pod# 1  1. Doing well, continue routine post-op care. 2,  Anemia of acute blood loss:  PNV + Fe.
Department of Obstetrics and Gynecology  Labor and Delivery  Attending Post Partum Progress Note      SUBJECTIVE:  Pt without complaints, pain controlled, tolerating po, lochia wnl, the patient is currently breastfeeding. negative flatus. OBJECTIVE:      Vitals:  Vitals:    23 0856   BP: (!) 141/78   Pulse: 72   Resp: 14   Temp: 97.9 °F (36.6 °C)   SpO2:        RRR CTAB  ABDOMEN:  soft, non-distended, non-tender, + BS  FF below umbilicus  Incision: c/d/i with steri-strips  EXT: no edema    DATA:    CBC:    Lab Results   Component Value Date/Time    WBC 10.9 2023 09:37 AM    HGB 10.0 2023 09:37 AM    HCT 30.3 2023 09:37 AM     2023 09:37 AM       ASSESSMENT & PLAN:    40 y.o.   OB History          1    Para   1    Term   1            AB        Living   1         SAB        IAB        Ectopic        Molar        Multiple   0    Live Births   1             s/p C/S pod# 2  1. Doing well, continue routine post-op care.   2,  Anemia of acute blood loss:  PNV + Fe.   3. Discharge pending baby status    Bhupendra Ortiz MD
Dr. Cathryn Tran notified that pt was having recurrent late decels again, so I rechecked her and she was still 9/100/0. So I turned pitocin off at this time and repositioned pt in high fowlers position.  No new orders received
Dr. Chen Found notified of pt SVE of 1-2/70/-3 and that she SROM clear fluid at [address] [place] [address]. I informed him that pt had a couple late decels and a prolonged decel that resolved when I turned her to her other side. I told him she was still vinicio q1-3 minutes. Orders received to start pitocin as soon as I can per policy.
Dr. Diego Holding at bedside at this time discussing options with patient and . Decision made at this time for  section. Risks/benefits discussed with patient and significant other and all questions answered.
Dr. Henry Smith called and informed that pt was 9/100/0 and that I halved pitocin to 3 because she  was having late decels. No new orders received.
FHT Cat 1  Ctxs q2-5 min  Cx 9/C/0-molding/caput  BS 74    Close surveillance, allow continued labor with recheck in approx 1 1/2    BEAU Briones
Patient requesting epidural at this time. CORAL Baker notified. Will be to bedside.
Patient up for the first time with the help of 2 RNs. Assist x 2 provided; patient verbalized some lightheadedness and had one round of emesis. Pt was pivoted to the chair. Linens were changed, pericare performed, panties applied and pad changed. Patient whiting remains in place. Patient ambulated back to bed with minimal complaints. Patient is pink and stable.
Pitocin verified by RODNEY PadgettRN
Postpartum and infant care teaching completed and forms signed by patient. Copy witnessed by RN and given to patient. Patient verbalized understanding of all teaching points. Prescriptions given if applicable. Patient plans to follow-up with [facility] Provider as instructed. Patient verbalizes understanding of discharge instructions and denies further questions. ID bands checked. Mother's ID band and one of baby's ID bands removed and taped to footprint sheet, signed by patient and witnessed by RN. Patient discharged in stable condition accompanied by family/guardian. Discharged in wheelchair, holding baby in arms.
Spoke with Dr. Chen Found via telephone to make him aware that patient vinicio q1-2.5m. Cytotec #3 held at this time. Patient SVE 1/50/-3. Patient requesting epidural as well. Okay for patient to receive epidural at this time. Will notify CRNA.
Spoke with Dr. Leif Duran and informed him that I have not started Pitocin yet because pt was still having occasional late decels. Orders received to place henok.
page C5 in their discharge booklet they can take. 13. Do you have any other questions or concerns I can address today?  Y/N  ______________      _________________________________________________________________________    Information provided during call :_________________________________________________  ___________________________________________________________________________    Call completed by:____________________________    Date:_________ Time:___________

## 2023-03-31 NOTE — DISCHARGE INSTRUCTIONS
Thank you for the opportunity to care for you and your family. We hope that you are happy with the care we provided during your stay in the [facility]. We want to ensure that you have the help that you need when you leave the hospital. If there is anything that we can assist you with please let us know. Breastfeeding mothers may contact our lactation specialists with any problems or questions. [facility] phone number is [phone]. Leave a message and your call will be returned. Please refer to the information provided in the postpartum care booklet. The following are warning signs to remember. Call 911 if you have:    Chest pain or pressure  Shortness of breath, even at rest  Thoughts of hurting yourself or others  Seizures    Call your healthcare provider if you have:    Temperature of 100.4 degrees or higher  Stitches that are not healing             --Swelling, bleeding, drainage, foul odor, redness or warmth in/around your                 stitches, staples, or incision (scar)               -- Bad smelling blood or discharge from the vagina  Vaginal bleeding that has increased             --Soaking through one pad in an hour             --You are passing clots larger than the size of a lemon. Red, warm tender area(s) in your breast or calf. Headache that does not get better, even after taking medicine; or headache with vision changes    Remember to notify all healthcare providers from your date of delivery up to one year after birth! CARING FOR YOURSELF      DIET/ACTIVITY    Eat a well balanced diet focusing on food high in fiber and protein. Drink plenty of fluids, especially water. To avoid constipation you may take a mild stool softener as recommended by your doctor or midwife. Gradually increase your activity. Resume an exercise regime only after being advised by your doctor or midwife.   When sitting or lying down, keep your legs elevated to reduce swelling. Avoid lifting anything heavier than your baby or a gallon of milk. Avoid driving for two weeks or while taking narcotics. No sexual intercourse for 6 weeks, or until advised by your OB provider. Nothing in the vagina: intercourse, tampons or douching. Be prepared to discuss family planning at your follow up OB visit. If you feel tired and have a lack of energy, you may continue to take your prenatal vitamins. Nap when your baby naps to catch up on sleep. EMOTIONS    You may feel elias, sad, teary and overwhelmed. Contact your OB provider if you think you may be showing signs of postpartum depression. If your baby will not stop crying, contact another adult to help or place the baby in its crib on its back and take a break. Never shake your baby! INCISIONAL/SUNDAY CARE    Clean your incision in the shower with mild soap. After shower pat the incision area dry and allow it to be open to the air. If used, steri strips should be removed by 2 weeks. If you are discharged with staples in place, call your OB provider's office to schedule removal.  Vaginal bleeding will decrease in amount over the next few weeks. Cleanse your perineum from front to back using the sunday bottle, after toileting, until bleeding stops. You will notice that as your activity increases, your flow may also increase. This is a sign that you need to rest more often. Call your OB provider if you are saturating more than one maxi pad in an hour and rest does not help. BREAST CARE    FOR BREASTFEEDING MOMS:    If you become engorged, feeding may be more difficult or painful for 1 to 2 days. You may find it helpful to hand express some milk so that the infant can latch more easily. While breastfeeding continue to take your prenatal vitamins as directed. Refer to the breastfeeding information in the discharge binder.     FOR NON-BREASTFEEDING MOMS:    You may apply ice packs to your breasts, over your bra, for 20

## 2023-03-31 NOTE — LACTATION NOTE
This note was copied from a baby's chart. Lactation Progress Note      Data:    Follow up consult for primip on DOS with an infant born at 39.1 weeks gestation. Action:     Assisted MOB get infant into football position at right breast & guided mothers hands for a DENITA; SRS noted. Reviewed breast feeding education & infant feeding cues, and encouraged mother to allow infant to breast feed on demand anytime feeding cues are shown and if no feeding cues are shown to attempt to wake infant to feed every 2-3 hours. If infant is still too sleepy to latch to hand express colostrum into infants mouth for about ten minutes, then try again in 2-3 hours. After the first day of life to breast feed a minimum of 8-12 times a day per 24 hour period. Breast feeding log reviewed, all questions answered. Mother instructed to call lactation for F/U care as needed. Response:    MOB verbalized an understanding of education provided and will call for assistance as needed.
This note was copied from a baby's chart. Lactation Progress Note      Data:   F/U with primip 2PO with breastfeeding and lactation rounds. Infant is 39.1 weeks gestation, mob with GDM. MOB states that infant continues breastfeeding well and denies any questions or concerns at time of consult. Denies nipple soreness. Feels ready for discharge home today. Feeding Method: Feeding Method Used: Breastfeeding   Urine output:  x 3 established   Stool output:  x 2 established  Percent weight change from birth:  -6%  Infant BG 45, 54, 65, 44, 55    Action: Introduced self to patient as LC for the day. Name and number placed on whiteboard. BF education reviewed with patient and what to expect over then next 1-2 weeks with mature milk production, infant feedings, infant output, breast care, engorgement prevention, pacifier, bottle use, and pumping information. Discharge binder also reviewed with patient with f/u care and resources provided. All questions answered at this time. RN updated with education that was provided to patient. Patient instructed to call for f/u care as needed. Response: MOB verbalizes understanding of bf education that was provided. Comfortable with breastfeeding at time of consult. Will f/u with outpatient services as needed.
This note was copied from a baby's chart. Lactation Progress Note      Data:   Follow up with primip breastfeeder. Mother says infant is due to try to feed. Pointed out infant was bringing hands to her mouth upon entry to room. Parents state infant sometimes gets very fussy at breast or very sleepy. Action: Introduced self, number on whiteboard. Assisted mother with positioning in football hold. Initially infant attempting to suck in nipple. Mother encouraged to offer breast support to assist in deepening latch and encouraging sustained suck. Infant with wide open mouth, DENITA achieved with SRS and AS noted throughout feed. Infant became sleepy within first few minutes so constant gentle stimulation shown to encourage sucking. Infant remained feeding for 10 minutes and continued when leaving room. Response: Mother pleased with length of feed. Encouraged to call for follow up as needed.
This note was copied from a baby's chart. Lactation Progress Note      Data:   Initial assessment of primip breastfeeder attempting first feed after birth. Mother a bit sleepy from IV meds given. Skin to skin with infant showing hunger cues upon entry to room. Action: Introduced self, number written on whiteboard. Repositioned infant towards breast. Infant with wide open mouth, DENITA achieved easily with SRS. Infant nose buried into breast and pulling away to breathe. Repositioned to right breast as maternal positioning is better for her post  birth. Infant initially attempted to suck in nipple. Released latch and attempted again. Infant with wide open mouth, DENITA achieved with SRS. Educated on feeding cues and encouraged ad sagar feeds with cues. If infant is sleepy or disinterested, encouraged skin to skin and hand expression with offering of colostrum every 2-3 hours. Reviewed ways to know infant is getting enough including weight changes, output and satisfaction cues. Stressed importance of deep comfortable latch and ways to achieve this. Infant fed for 12 minutes before this RN left room. Booklet given. Response: Mother pleased with first feeding attempt. Encouraged to call for follow up.
Verbalized understanding of teaching provided. Remains STS with sleepy baby. Will call for f/u support prn.

## 2023-03-31 NOTE — DISCHARGE SUMMARY
Obstetrical Discharge Form    Gestational Age:39w1d    Antepartum complications: [id number]    Date of Delivery: 3/31/2023      Type of Delivery:  for failure to progress    Delivered By:  Dr. Leone Fail:   Information for the patient's :  Damien Carrillo [[id number]]      Anesthesia: Epidural    Intrapartum complications: None    Postpartum complications: none    Discharge Medication:      Medication List        START taking these medications      docusate 100 MG Caps  Commonly known as: COLACE, DULCOLAX  Take 100 mg by mouth 2 times daily     ibuprofen 800 MG tablet  Commonly known as: ADVIL;MOTRIN  Take 1 tablet by mouth every 8 hours as needed for Pain     oxyCODONE 5 MG immediate release tablet  Commonly known as: ROXICODONE  Take 1 tablet by mouth every 4 hours as needed for Pain for up to 3 days.  Max Daily Amount: 30 mg            CONTINUE taking these medications      PRENATAL 1+1 PO            STOP taking these medications      AZO BLADDER CONTROL/GO-LESS PO     glyBURIDE 2.5 MG tablet  Commonly known as: DIABETA     metFORMIN 1000 MG tablet  Commonly known as: GLUCOPHAGE               Where to Get Your Medications        These medications were sent to [facility] [id number] [facility], [address] [place] [address], [address] [address]      Phone: [phone]   docusate 100 MG Caps  ibuprofen 800 MG tablet       You can get these medications from any pharmacy    Bring a paper prescription for each of these medications  oxyCODONE 5 MG immediate release tablet          Discharge Date: 3/13/23    Condition on discharge: Stable    Plan:   Follow up in 4-6 weeks  Reviewed discharge instructions and scripts     Andres Marino MD

## 2023-04-06 ENCOUNTER — OFFICE VISIT (OUTPATIENT)
Dept: FAMILY MEDICINE CLINIC | Age: 38
End: 2023-04-06

## 2023-04-06 VITALS
OXYGEN SATURATION: 98 % | BODY MASS INDEX: 30.22 KG/M2 | SYSTOLIC BLOOD PRESSURE: 132 MMHG | DIASTOLIC BLOOD PRESSURE: 72 MMHG | HEART RATE: 87 BPM | HEIGHT: 66 IN | WEIGHT: 188 LBS

## 2023-04-06 DIAGNOSIS — Z76.89 ENCOUNTER TO ESTABLISH CARE: Primary | ICD-10-CM

## 2023-04-06 DIAGNOSIS — R73.03 PREDIABETES: ICD-10-CM

## 2023-04-06 DIAGNOSIS — Z00.00 HEALTHCARE MAINTENANCE: ICD-10-CM

## 2023-04-06 PROBLEM — N12 PYELONEPHRITIS: Status: RESOLVED | Noted: 2019-09-18 | Resolved: 2023-04-06

## 2023-04-06 PROBLEM — Z37.9 NORMAL LABOR: Status: RESOLVED | Noted: 2023-03-28 | Resolved: 2023-04-06

## 2023-04-06 PROBLEM — M54.16 LUMBAR RADICULOPATHY: Status: RESOLVED | Noted: 2017-08-21 | Resolved: 2023-04-06

## 2023-04-06 PROBLEM — N20.1 CALCULUS OF DISTAL RIGHT URETER: Status: RESOLVED | Noted: 2019-09-18 | Resolved: 2023-04-06

## 2023-04-06 PROBLEM — Z3A.39 39 WEEKS GESTATION OF PREGNANCY: Status: RESOLVED | Noted: 2023-03-29 | Resolved: 2023-04-06

## 2023-04-06 RX ORDER — METFORMIN HYDROCHLORIDE 500 MG/1
500 TABLET, EXTENDED RELEASE ORAL
Qty: 180 TABLET | Refills: 1 | Status: SHIPPED | OUTPATIENT
Start: 2023-04-06 | End: 2023-07-05

## 2023-04-06 RX ORDER — METFORMIN HYDROCHLORIDE 500 MG/1
TABLET, EXTENDED RELEASE ORAL
COMMUNITY
Start: 2021-12-14 | End: 2023-04-06 | Stop reason: SDUPTHER

## 2023-04-06 SDOH — ECONOMIC STABILITY: FOOD INSECURITY: WITHIN THE PAST 12 MONTHS, THE FOOD YOU BOUGHT JUST DIDN'T LAST AND YOU DIDN'T HAVE MONEY TO GET MORE.: NEVER TRUE

## 2023-04-06 SDOH — HEALTH STABILITY: PHYSICAL HEALTH: ON AVERAGE, HOW MANY MINUTES DO YOU ENGAGE IN EXERCISE AT THIS LEVEL?: 20 MIN

## 2023-04-06 SDOH — ECONOMIC STABILITY: FOOD INSECURITY: WITHIN THE PAST 12 MONTHS, YOU WORRIED THAT YOUR FOOD WOULD RUN OUT BEFORE YOU GOT MONEY TO BUY MORE.: NEVER TRUE

## 2023-04-06 SDOH — ECONOMIC STABILITY: INCOME INSECURITY: HOW HARD IS IT FOR YOU TO PAY FOR THE VERY BASICS LIKE FOOD, HOUSING, MEDICAL CARE, AND HEATING?: NOT HARD AT ALL

## 2023-04-06 SDOH — HEALTH STABILITY: PHYSICAL HEALTH: ON AVERAGE, HOW MANY DAYS PER WEEK DO YOU ENGAGE IN MODERATE TO STRENUOUS EXERCISE (LIKE A BRISK WALK)?: 2 DAYS

## 2023-04-06 SDOH — ECONOMIC STABILITY: HOUSING INSECURITY
IN THE LAST 12 MONTHS, WAS THERE A TIME WHEN YOU DID NOT HAVE A STEADY PLACE TO SLEEP OR SLEPT IN A SHELTER (INCLUDING NOW)?: NO

## 2023-04-06 ASSESSMENT — ENCOUNTER SYMPTOMS
EYES NEGATIVE: 1
GASTROINTESTINAL NEGATIVE: 1
RESPIRATORY NEGATIVE: 1

## 2023-04-06 ASSESSMENT — ANXIETY QUESTIONNAIRES
1. FEELING NERVOUS, ANXIOUS, OR ON EDGE: 0
IF YOU CHECKED OFF ANY PROBLEMS ON THIS QUESTIONNAIRE, HOW DIFFICULT HAVE THESE PROBLEMS MADE IT FOR YOU TO DO YOUR WORK, TAKE CARE OF THINGS AT HOME, OR GET ALONG WITH OTHER PEOPLE: NOT DIFFICULT AT ALL
6. BECOMING EASILY ANNOYED OR IRRITABLE: 0
5. BEING SO RESTLESS THAT IT IS HARD TO SIT STILL: 0
4. TROUBLE RELAXING: 0
3. WORRYING TOO MUCH ABOUT DIFFERENT THINGS: 0
7. FEELING AFRAID AS IF SOMETHING AWFUL MIGHT HAPPEN: 0
2. NOT BEING ABLE TO STOP OR CONTROL WORRYING: 0
GAD7 TOTAL SCORE: 0

## 2023-04-06 ASSESSMENT — PATIENT HEALTH QUESTIONNAIRE - PHQ9
SUM OF ALL RESPONSES TO PHQ QUESTIONS 1-9: 0
2. FEELING DOWN, DEPRESSED OR HOPELESS: 0
SUM OF ALL RESPONSES TO PHQ9 QUESTIONS 1 & 2: 0
1. LITTLE INTEREST OR PLEASURE IN DOING THINGS: 0

## 2023-04-06 ASSESSMENT — SOCIAL DETERMINANTS OF HEALTH (SDOH)

## 2023-04-06 NOTE — PROGRESS NOTES
(before meals), Disp-180 tablet, R-1Normal    Return in about 3 months (around 7/6/2023) for prediabetes.          --Maria M Sandoval, APRN - CNP

## 2023-07-13 ENCOUNTER — OFFICE VISIT (OUTPATIENT)
Dept: FAMILY MEDICINE CLINIC | Age: 38
End: 2023-07-13
Payer: COMMERCIAL

## 2023-07-13 VITALS
DIASTOLIC BLOOD PRESSURE: 68 MMHG | SYSTOLIC BLOOD PRESSURE: 130 MMHG | HEART RATE: 83 BPM | HEIGHT: 66 IN | OXYGEN SATURATION: 99 % | BODY MASS INDEX: 30.53 KG/M2 | WEIGHT: 190 LBS

## 2023-07-13 DIAGNOSIS — Z87.898 HISTORY OF PREDIABETES: Primary | ICD-10-CM

## 2023-07-13 DIAGNOSIS — L30.9 ECZEMA, UNSPECIFIED TYPE: ICD-10-CM

## 2023-07-13 DIAGNOSIS — Z86.2 HISTORY OF IRON DEFICIENCY ANEMIA: ICD-10-CM

## 2023-07-13 DIAGNOSIS — Z86.2 HISTORY OF ANEMIA DUE TO VITAMIN B12 DEFICIENCY: ICD-10-CM

## 2023-07-13 LAB — HBA1C MFR BLD: 4.6 %

## 2023-07-13 PROCEDURE — 99214 OFFICE O/P EST MOD 30 MIN: CPT | Performed by: NURSE PRACTITIONER

## 2023-07-13 PROCEDURE — 83037 HB GLYCOSYLATED A1C HOME DEV: CPT | Performed by: NURSE PRACTITIONER

## 2023-07-13 NOTE — PROGRESS NOTES
2023     Huy Hamilton (:  1985) is a 40 y.o. female, here for evaluation of the following medical concerns:    HPI  Pt is here today for prediabetes f/u. Last OV with me was 2023:    Prediabetes  Pt states that her BS's have dropped significantly since she gave birth to her daughter one week ago. She has stopped Glyburide and decreased metformin to 500 mg BID. Monitoring BS at home. States that fasting morning BS's have been around 100-108. Will hold off on doing an A1C today since it will likely not be accurate d/t recent pregnancy. Advised pt to continue to closely monitor BS's at home. If drops below 100, decrease metformin to 500 mg daily. F/u with me in 3 months or sooner if needed. Will repeat A1C at that time. Advised pt to closely watch for s/s of hypoglycemia. -     metFORMIN (GLUCOPHAGE-XR) 500 MG extended release tablet; Take 1 tablet by mouth 2 times daily (before meals), Disp-180 tablet, R-1Normal     Return in about 3 months (around 2023) for prediabetes. Pt states that she is taking metformin 500 mg BID as directed. Monitors BS occassionally in the mornings when fasting and states that it runs around 100. Denies s/s of hypoglycemia. A1C today is 4.6. Pt states that she has a h/o iron deficiency anemia and rec'd iron infusions at [facility] when she was pregnant. She states that they also told her that her B12 level was low and that she may need B12 injections, but never heard anymore about it. She does not take any oral supplements. C/o chronic fatigue, but has attributed that to working and having a 2 month old. Pt states that she has had \"really bad\" eczema since she had her child. She has tried oral and topical steroids. She has an appt with a dermatologist in 3 months, but would like another referral to see if she can get in with someone else sooner. She saw Ortiz Puga, [address] [facility] for this on 6/15/2023.     Review of Systems   All other systems

## 2023-07-14 LAB
BASOPHILS # BLD: 0 K/UL (ref 0–0.2)
BASOPHILS NFR BLD: 0.6 %
DEPRECATED RDW RBC AUTO: 13.7 % (ref 12.4–15.4)
EOSINOPHIL # BLD: 0.2 K/UL (ref 0–0.6)
EOSINOPHIL NFR BLD: 3.2 %
FERRITIN SERPL IA-MCNC: 60.2 NG/ML (ref 15–150)
FOLATE SERPL-MCNC: >20 NG/ML (ref 4.78–24.2)
HCT VFR BLD AUTO: 40.9 % (ref 36–48)
HGB BLD-MCNC: 13.8 G/DL (ref 12–16)
IRON SATN MFR SERPL: 15 % (ref 15–50)
IRON SERPL-MCNC: 57 UG/DL (ref 37–145)
LYMPHOCYTES # BLD: 2.1 K/UL (ref 1–5.1)
LYMPHOCYTES NFR BLD: 30.7 %
MCH RBC QN AUTO: 29.7 PG (ref 26–34)
MCHC RBC AUTO-ENTMCNC: 33.8 G/DL (ref 31–36)
MCV RBC AUTO: 87.9 FL (ref 80–100)
MONOCYTES # BLD: 0.4 K/UL (ref 0–1.3)
MONOCYTES NFR BLD: 5.6 %
NEUTROPHILS # BLD: 4 K/UL (ref 1.7–7.7)
NEUTROPHILS NFR BLD: 59.9 %
PLATELET # BLD AUTO: 373 K/UL (ref 135–450)
PMV BLD AUTO: 7.5 FL (ref 5–10.5)
RBC # BLD AUTO: 4.66 M/UL (ref 4–5.2)
TIBC SERPL-MCNC: 390 UG/DL (ref 260–445)
VIT B12 SERPL-MCNC: 354 PG/ML (ref 211–911)
WBC # BLD AUTO: 6.7 K/UL (ref 4–11)

## 2024-03-04 ENCOUNTER — HOSPITAL ENCOUNTER (EMERGENCY)
Age: 39
Discharge: HOME | End: 2024-03-05

## 2024-03-04 DIAGNOSIS — B34.9 VIRAL ILLNESS: Primary | ICD-10-CM

## 2024-03-04 DIAGNOSIS — R11.0 NAUSEA: ICD-10-CM

## 2024-03-04 LAB
ALBUMIN SERPL-MCNC: 4.1 G/DL (ref 3.4–5)
ALBUMIN/GLOB SERPL: 1.3 {RATIO} (ref 1.1–2.2)
ALP SERPL-CCNC: 71 U/L (ref 40–129)
ALT SERPL-CCNC: 63 U/L (ref 10–40)
ANION GAP SERPL CALCULATED.3IONS-SCNC: 12 MMOL/L (ref 3–16)
AST SERPL-CCNC: 66 U/L (ref 15–37)
BACTERIA URNS QL MICRO: ABNORMAL /HPF
BASOPHILS # BLD: 0 K/UL (ref 0–0.2)
BASOPHILS NFR BLD: 0.2 %
BILIRUB SERPL-MCNC: 0.7 MG/DL (ref 0–1)
BILIRUB UR QL STRIP.AUTO: ABNORMAL
BUN SERPL-MCNC: 5 MG/DL (ref 7–20)
CALCIUM SERPL-MCNC: 8.6 MG/DL (ref 8.3–10.6)
CHLORIDE SERPL-SCNC: 98 MMOL/L (ref 99–110)
CLARITY UR: ABNORMAL
CO2 SERPL-SCNC: 19 MMOL/L (ref 21–32)
COLOR UR: ABNORMAL
CREAT SERPL-MCNC: 0.6 MG/DL (ref 0.6–1.1)
DEPRECATED RDW RBC AUTO: 14 % (ref 12.4–15.4)
EOSINOPHIL # BLD: 0 K/UL (ref 0–0.6)
EOSINOPHIL NFR BLD: 0 %
EPI CELLS #/AREA URNS HPF: ABNORMAL /HPF (ref 0–5)
FLUAV RNA RESP QL NAA+PROBE: NOT DETECTED
FLUBV RNA RESP QL NAA+PROBE: NOT DETECTED
GFR SERPLBLD CREATININE-BSD FMLA CKD-EPI: >60 ML/MIN/{1.73_M2}
GLUCOSE SERPL-MCNC: 121 MG/DL (ref 70–99)
GLUCOSE UR STRIP.AUTO-MCNC: NEGATIVE MG/DL
HCT VFR BLD AUTO: 37.9 % (ref 36–48)
HGB BLD-MCNC: 12.3 G/DL (ref 12–16)
HGB UR QL STRIP.AUTO: ABNORMAL
KETONES UR STRIP.AUTO-MCNC: >=80 MG/DL
LEUKOCYTE ESTERASE UR QL STRIP.AUTO: ABNORMAL
LYMPHOCYTES # BLD: 0.7 K/UL (ref 1–5.1)
LYMPHOCYTES NFR BLD: 5.4 %
MAGNESIUM SERPL-MCNC: 1.7 MG/DL (ref 1.8–2.4)
MCH RBC QN AUTO: 29.3 PG (ref 26–34)
MCHC RBC AUTO-ENTMCNC: 32.5 G/DL (ref 31–36)
MCV RBC AUTO: 90.1 FL (ref 80–100)
MONOCYTES # BLD: 0.9 K/UL (ref 0–1.3)
MONOCYTES NFR BLD: 6.6 %
NEUTROPHILS # BLD: 11.9 K/UL (ref 1.7–7.7)
NEUTROPHILS NFR BLD: 87.8 %
NITRITE UR QL STRIP.AUTO: NEGATIVE
PH UR STRIP.AUTO: 6.5 [PH] (ref 5–8)
PLATELET # BLD AUTO: 278 K/UL (ref 135–450)
PMV BLD AUTO: 7.1 FL (ref 5–10.5)
POTASSIUM SERPL-SCNC: 3.5 MMOL/L (ref 3.5–5.1)
PROT SERPL-MCNC: 7.3 G/DL (ref 6.4–8.2)
PROT UR STRIP.AUTO-MCNC: 100 MG/DL
RBC # BLD AUTO: 4.21 M/UL (ref 4–5.2)
RBC #/AREA URNS HPF: ABNORMAL /HPF (ref 0–4)
SARS-COV-2 RNA RESP QL NAA+PROBE: NOT DETECTED
SODIUM SERPL-SCNC: 129 MMOL/L (ref 136–145)
SP GR UR STRIP.AUTO: 1.02 (ref 1–1.03)
UA COMPLETE W REFLEX CULTURE PNL UR: YES
UA DIPSTICK W REFLEX MICRO PNL UR: YES
URN SPEC COLLECT METH UR: ABNORMAL
UROBILINOGEN UR STRIP-ACNC: 1 E.U./DL
WBC # BLD AUTO: 13.5 K/UL (ref 4–11)
WBC #/AREA URNS HPF: ABNORMAL /HPF (ref 0–5)

## 2024-03-04 PROCEDURE — 83605 ASSAY OF LACTIC ACID: CPT

## 2024-03-04 PROCEDURE — 85025 COMPLETE CBC W/AUTO DIFF WBC: CPT

## 2024-03-04 PROCEDURE — 6370000000 HC RX 637 (ALT 250 FOR IP): Performed by: PHYSICIAN ASSISTANT

## 2024-03-04 PROCEDURE — 81001 URINALYSIS AUTO W/SCOPE: CPT

## 2024-03-04 PROCEDURE — 80053 COMPREHEN METABOLIC PANEL: CPT

## 2024-03-04 PROCEDURE — 87077 CULTURE AEROBIC IDENTIFY: CPT

## 2024-03-04 PROCEDURE — 87636 SARSCOV2 & INF A&B AMP PRB: CPT

## 2024-03-04 PROCEDURE — 96365 THER/PROPH/DIAG IV INF INIT: CPT

## 2024-03-04 PROCEDURE — 96361 HYDRATE IV INFUSION ADD-ON: CPT

## 2024-03-04 PROCEDURE — 83735 ASSAY OF MAGNESIUM: CPT

## 2024-03-04 PROCEDURE — 87086 URINE CULTURE/COLONY COUNT: CPT

## 2024-03-04 PROCEDURE — 2580000003 HC RX 258: Performed by: PHYSICIAN ASSISTANT

## 2024-03-04 PROCEDURE — 6360000002 HC RX W HCPCS: Performed by: PHYSICIAN ASSISTANT

## 2024-03-04 PROCEDURE — 87040 BLOOD CULTURE FOR BACTERIA: CPT

## 2024-03-04 PROCEDURE — 96366 THER/PROPH/DIAG IV INF ADDON: CPT

## 2024-03-04 PROCEDURE — 99284 EMERGENCY DEPT VISIT MOD MDM: CPT

## 2024-03-04 RX ORDER — 0.9 % SODIUM CHLORIDE 0.9 %
1000 INTRAVENOUS SOLUTION INTRAVENOUS ONCE
Status: COMPLETED | OUTPATIENT
Start: 2024-03-04 | End: 2024-03-04

## 2024-03-04 RX ORDER — MAGNESIUM SULFATE IN WATER 40 MG/ML
2000 INJECTION, SOLUTION INTRAVENOUS ONCE
Status: COMPLETED | OUTPATIENT
Start: 2024-03-04 | End: 2024-03-05

## 2024-03-04 RX ORDER — ACETAMINOPHEN 325 MG/1
650 TABLET ORAL ONCE
Status: COMPLETED | OUTPATIENT
Start: 2024-03-04 | End: 2024-03-04

## 2024-03-04 RX ORDER — ONDANSETRON 4 MG/1
4 TABLET, ORALLY DISINTEGRATING ORAL ONCE
Status: COMPLETED | OUTPATIENT
Start: 2024-03-04 | End: 2024-03-04

## 2024-03-04 RX ADMIN — SODIUM CHLORIDE 1000 ML: 9 INJECTION, SOLUTION INTRAVENOUS at 22:01

## 2024-03-04 RX ADMIN — MAGNESIUM SULFATE HEPTAHYDRATE 2000 MG: 40 INJECTION, SOLUTION INTRAVENOUS at 22:44

## 2024-03-04 RX ADMIN — ACETAMINOPHEN 650 MG: 325 TABLET ORAL at 23:40

## 2024-03-04 RX ADMIN — ONDANSETRON 4 MG: 4 TABLET, ORALLY DISINTEGRATING ORAL at 22:07

## 2024-03-04 ASSESSMENT — PAIN - FUNCTIONAL ASSESSMENT: PAIN_FUNCTIONAL_ASSESSMENT: NONE - DENIES PAIN

## 2024-03-05 VITALS
BODY MASS INDEX: 31.34 KG/M2 | WEIGHT: 195 LBS | OXYGEN SATURATION: 97 % | DIASTOLIC BLOOD PRESSURE: 67 MMHG | HEART RATE: 98 BPM | TEMPERATURE: 98.5 F | SYSTOLIC BLOOD PRESSURE: 108 MMHG | RESPIRATION RATE: 14 BRPM | HEIGHT: 66 IN

## 2024-03-05 LAB — LACTATE BLDV-SCNC: 1 MMOL/L (ref 0.4–2)

## 2024-03-05 PROCEDURE — 2580000003 HC RX 258: Performed by: PHYSICIAN ASSISTANT

## 2024-03-05 PROCEDURE — 96361 HYDRATE IV INFUSION ADD-ON: CPT

## 2024-03-05 RX ORDER — ONDANSETRON 4 MG/1
4 TABLET, FILM COATED ORAL 3 TIMES DAILY PRN
Qty: 15 TABLET | Refills: 0 | Status: SHIPPED | OUTPATIENT
Start: 2024-03-05

## 2024-03-05 RX ORDER — 0.9 % SODIUM CHLORIDE 0.9 %
500 INTRAVENOUS SOLUTION INTRAVENOUS ONCE
Status: COMPLETED | OUTPATIENT
Start: 2024-03-05 | End: 2024-03-05

## 2024-03-05 RX ADMIN — SODIUM CHLORIDE 500 ML: 9 INJECTION, SOLUTION INTRAVENOUS at 00:50

## 2024-03-06 LAB
BACTERIA UR CULT: ABNORMAL
BACTERIA UR CULT: ABNORMAL
ORGANISM: ABNORMAL

## 2024-03-08 NOTE — ED PROVIDER NOTES
[facility] Emergency Department    CHIEF COMPLAINT  Fever (101 at home pt is about 9 weeks pregnant. Pt states she doesn't think she is getting enough fluid)      SHARED SERVICE VISIT  Evaluated by ANGIE.  My supervising physician was available for consultation.    HISTORY OF PRESENT ILLNESS  Kaity Roman is a 38 y.o. female who presents to the ED complaining of viral illness.  She has been experiencing nausea, vomiting, and at home temperatures of 101.0 °F.  Her major concerns are that she is dehydrated due to the inability to tolerate p.o. solids and liquids due to nausea and vomiting.  Currently 9 weeks pregnant.  Loss of experiencing bodyaches and chills with sneezing coughing pain sore throat.  Denies any chest pain denies any chest pain, shortness of breath, or dyspnea on exertion.  Denies any urinary symptoms.  Denies any diarrhea or bloody stools.  Denies any new onset back pain.  Denies any recent travel or sick contacts.  No other complaints, modifying factors or associated symptoms.     Nursing notes reviewed.   Past Medical History:   Diagnosis Date    Anemia     was doing FeSO4 infusions    Fracture, shoulder     left shoulder nonop seeing PT 2019    Gestational diabetes     G1.  Takes Metformin 1000 BID, Glyburide 2.5mg QD    PCOS (polycystic ovarian syndrome)     Polycystic ovarian syndrome      Past Surgical History:   Procedure Laterality Date     SECTION N/A 3/29/2023     SECTION performed by Jeanmarie Zepeda MD at [facility] OR    CYST REMOVAL       Family History   Problem Relation Age of Onset    High Blood Pressure Mother     High Blood Pressure Father     Diabetes Father      Social History     Socioeconomic History    Marital status:      Spouse name: Not on file    Number of children: Not on file    Years of education: Not on file    Highest education level: Not on file   Occupational History    Not on file   Tobacco Use    Smoking

## 2024-03-09 LAB
BACTERIA BLD CULT ORG #2: NORMAL
BACTERIA BLD CULT: NORMAL

## (undated) DEVICE — PAD,NON-ADHERENT,3X8,STERILE,LF,1/PK: Brand: MEDLINE

## (undated) DEVICE — 3M™ STERI-STRIP™ COMPOUND BENZOIN TINCTURE 40 BAGS/CARTON 4 CARTONS/CASE C1544: Brand: 3M™ STERI-STRIP™

## (undated) DEVICE — CHLORAPREP 26ML ORANGE

## (undated) DEVICE — Device

## (undated) DEVICE — TRAY URIN CATH 16FR DRNGE BG STATLOK STBL DEV F SURSTP

## (undated) DEVICE — DRESSING COMP IS W4XL10IN PD W2XL8IN CNTCT LAYR ADH

## (undated) DEVICE — SOLUTION IV IRRIG POUR BRL 0.9% SODIUM CHL 2F7124

## (undated) DEVICE — SUTURE MCRYL SZ 0 L36IN ABSRB VLT CT L40MM 1/2 CIR TAPR PNT Y358H

## (undated) DEVICE — S/USE RESUS KIT W/O MASK (10): Brand: FISHER & PAYKEL HEALTHCARE

## (undated) DEVICE — SUTURE MAXON 0 L36IN GRN ABSRB GS-24 L40MM 1/2 CIR REINF 8886628761

## (undated) DEVICE — SUTURE COAT VCRL SZ 4-0 L18IN ABSRB UD L19MM PS-2 1/2 CIR J496G

## (undated) DEVICE — GLOVE ORANGE PI 7 1/2   MSG9075

## (undated) DEVICE — SUTURE MCRYL SZ 3-0 L36IN ABSRB UD L36MM CT-1 1/2 CIR Y944H

## (undated) DEVICE — GARMENT COMPR L FOR 23IN CALF FLOTRN